# Patient Record
Sex: FEMALE | HISPANIC OR LATINO | Employment: FULL TIME | URBAN - NONMETROPOLITAN AREA
[De-identification: names, ages, dates, MRNs, and addresses within clinical notes are randomized per-mention and may not be internally consistent; named-entity substitution may affect disease eponyms.]

---

## 2024-01-08 ENCOUNTER — HOSPITAL ENCOUNTER (EMERGENCY)
Facility: HOSPITAL | Age: 29
Discharge: HOME | End: 2024-01-08
Attending: EMERGENCY MEDICINE
Payer: MEDICAID

## 2024-01-08 VITALS
OXYGEN SATURATION: 95 % | SYSTOLIC BLOOD PRESSURE: 141 MMHG | WEIGHT: 265 LBS | HEIGHT: 67 IN | TEMPERATURE: 99 F | BODY MASS INDEX: 41.59 KG/M2 | HEART RATE: 75 BPM | DIASTOLIC BLOOD PRESSURE: 90 MMHG | RESPIRATION RATE: 16 BRPM

## 2024-01-08 DIAGNOSIS — J02.0 ACUTE STREPTOCOCCAL PHARYNGITIS: Primary | ICD-10-CM

## 2024-01-08 DIAGNOSIS — R52 PAIN: ICD-10-CM

## 2024-01-08 LAB
FLUAV RNA RESP QL NAA+PROBE: NOT DETECTED
FLUBV RNA RESP QL NAA+PROBE: NOT DETECTED
HETEROPH AB SERPLBLD QL IA.RAPID: NEGATIVE
S PYO DNA THROAT QL NAA+PROBE: NOT DETECTED
SARS-COV-2 RNA RESP QL NAA+PROBE: NOT DETECTED

## 2024-01-08 PROCEDURE — 99283 EMERGENCY DEPT VISIT LOW MDM: CPT | Performed by: EMERGENCY MEDICINE

## 2024-01-08 PROCEDURE — 87651 STREP A DNA AMP PROBE: CPT | Performed by: EMERGENCY MEDICINE

## 2024-01-08 PROCEDURE — 86308 HETEROPHILE ANTIBODY SCREEN: CPT | Performed by: EMERGENCY MEDICINE

## 2024-01-08 PROCEDURE — 87636 SARSCOV2 & INF A&B AMP PRB: CPT | Performed by: EMERGENCY MEDICINE

## 2024-01-08 PROCEDURE — 36415 COLL VENOUS BLD VENIPUNCTURE: CPT | Performed by: EMERGENCY MEDICINE

## 2024-01-08 RX ORDER — CEPHALEXIN 500 MG/1
500 CAPSULE ORAL 4 TIMES DAILY
Qty: 40 CAPSULE | Refills: 0 | Status: SHIPPED | OUTPATIENT
Start: 2024-01-08 | End: 2024-01-18

## 2024-01-08 RX ORDER — DEXAMETHASONE 6 MG/1
12 TABLET ORAL DAILY
Qty: 2 TABLET | Refills: 0 | Status: SHIPPED | OUTPATIENT
Start: 2024-01-08 | End: 2024-01-09

## 2024-01-08 RX ORDER — IBUPROFEN 600 MG/1
600 TABLET ORAL EVERY 8 HOURS PRN
Qty: 30 TABLET | Refills: 0 | Status: SHIPPED | OUTPATIENT
Start: 2024-01-08

## 2024-01-08 ASSESSMENT — COLUMBIA-SUICIDE SEVERITY RATING SCALE - C-SSRS
2. HAVE YOU ACTUALLY HAD ANY THOUGHTS OF KILLING YOURSELF?: NO
6. HAVE YOU EVER DONE ANYTHING, STARTED TO DO ANYTHING, OR PREPARED TO DO ANYTHING TO END YOUR LIFE?: NO
1. IN THE PAST MONTH, HAVE YOU WISHED YOU WERE DEAD OR WISHED YOU COULD GO TO SLEEP AND NOT WAKE UP?: NO

## 2024-01-08 ASSESSMENT — PAIN SCALES - GENERAL: PAINLEVEL_OUTOF10: 6

## 2024-01-08 ASSESSMENT — PAIN - FUNCTIONAL ASSESSMENT: PAIN_FUNCTIONAL_ASSESSMENT: 0-10

## 2024-01-08 ASSESSMENT — PAIN DESCRIPTION - LOCATION: LOCATION: THROAT

## 2024-01-08 NOTE — ED PROVIDER NOTES
HPI   Chief Complaint   Patient presents with    Sore Throat     Pt to ED with sore throat and swollen glands, Pt verbalizes pain 6/10 with use of ibuprofen.  Saw her PCP 1 week prior with negative strep and Covid, no rx given at that time.  Pt has been treating symptoms but they've gotten worse, including low level fever       HPI                    No data recorded                Patient History   No past medical history on file.  No past surgical history on file.  No family history on file.  Social History     Tobacco Use    Smoking status: Not on file    Smokeless tobacco: Not on file   Substance Use Topics    Alcohol use: Not on file    Drug use: Not on file       Physical Exam   ED Triage Vitals [01/08/24 0903]   Temp Pulse Resp BP   37.2 °C (99 °F) -- 17 143/86      SpO2 Temp Source Heart Rate Source Patient Position   98 % Temporal Monitor Sitting      BP Location FiO2 (%)     Left arm --       Physical Exam  Constitutional:       General: She is not in acute distress.     Appearance: Normal appearance. She is not toxic-appearing.   HENT:      Head: Normocephalic and atraumatic.      Right Ear: Tympanic membrane normal.      Left Ear: Tympanic membrane normal.      Mouth/Throat:      Mouth: Mucous membranes are moist.      Tonsils: Tonsillar exudate present. No tonsillar abscesses. 1+ on the right. 1+ on the left.   Eyes:      Conjunctiva/sclera: Conjunctivae normal.      Pupils: Pupils are equal, round, and reactive to light.   Cardiovascular:      Rate and Rhythm: Normal rate and regular rhythm.      Pulses: Normal pulses.      Heart sounds: Normal heart sounds.   Pulmonary:      Effort: Pulmonary effort is normal. No respiratory distress.      Breath sounds: Normal breath sounds. No wheezing.   Abdominal:      General: Bowel sounds are normal.      Palpations: Abdomen is soft.      Tenderness: There is no abdominal tenderness. There is no guarding or rebound.   Musculoskeletal:         General: Normal  range of motion.      Cervical back: Normal range of motion.   Skin:     General: Skin is warm and dry.   Neurological:      General: No focal deficit present.      Mental Status: She is alert and oriented to person, place, and time.         ED Course & MDM   ED Course as of 01/08/24 1018   Mon Jan 08, 2024   0907 Seen and examined the pt, slightly red tonsils [KA]      ED Course User Index  [KA] Bernabe Gramajo DO         Diagnoses as of 01/08/24 1018   Acute streptococcal pharyngitis       Medical Decision Making  28-year-old female presents to the ER with chief complaint of sore throat patient reports that she takes strep and she thinks it strep even though she had a negative test today reretested her today all test came back negative again.  Patient probably has a viral pharyngitis.  But patient thinks that for sure she needs antibiotics.  Will give her dose of steroids anti-inflammatories and antibiotics.  Will give her referral to specialist.  Rest her exam is normal show some inflamed lymph nodes on exam.  The tonsillar pillars are slightly erythematous with exudate.  Monospot is negative patient discharged home to follow-up with specialist.        Procedure  Procedures     Bernabe Gramajo DO  01/08/24 1018

## 2024-02-27 ENCOUNTER — HOSPITAL ENCOUNTER (EMERGENCY)
Facility: HOSPITAL | Age: 29
Discharge: LEFT BEFORE DISCHARGE | End: 2024-02-27
Payer: MEDICAID

## 2024-02-27 PROCEDURE — 99281 EMR DPT VST MAYX REQ PHY/QHP: CPT

## 2024-02-27 PROCEDURE — 99283 EMERGENCY DEPT VISIT LOW MDM: CPT

## 2024-02-28 ENCOUNTER — HOSPITAL ENCOUNTER (EMERGENCY)
Facility: HOSPITAL | Age: 29
Discharge: HOME | End: 2024-02-28
Attending: EMERGENCY MEDICINE
Payer: MEDICAID

## 2024-02-28 VITALS
OXYGEN SATURATION: 96 % | HEART RATE: 92 BPM | HEIGHT: 67 IN | TEMPERATURE: 98.1 F | SYSTOLIC BLOOD PRESSURE: 117 MMHG | DIASTOLIC BLOOD PRESSURE: 83 MMHG | WEIGHT: 260 LBS | RESPIRATION RATE: 17 BRPM | BODY MASS INDEX: 40.81 KG/M2

## 2024-02-28 DIAGNOSIS — B34.9 VIRAL ILLNESS: Primary | ICD-10-CM

## 2024-02-28 LAB
FLUAV RNA RESP QL NAA+PROBE: NOT DETECTED
FLUBV RNA RESP QL NAA+PROBE: NOT DETECTED
S PYO DNA THROAT QL NAA+PROBE: NOT DETECTED
SARS-COV-2 RNA RESP QL NAA+PROBE: NOT DETECTED

## 2024-02-28 PROCEDURE — 87651 STREP A DNA AMP PROBE: CPT | Performed by: EMERGENCY MEDICINE

## 2024-02-28 PROCEDURE — 87636 SARSCOV2 & INF A&B AMP PRB: CPT | Performed by: EMERGENCY MEDICINE

## 2024-02-28 PROCEDURE — 2500000001 HC RX 250 WO HCPCS SELF ADMINISTERED DRUGS (ALT 637 FOR MEDICARE OP): Mod: SE | Performed by: EMERGENCY MEDICINE

## 2024-02-28 PROCEDURE — 99283 EMERGENCY DEPT VISIT LOW MDM: CPT

## 2024-02-28 RX ORDER — IBUPROFEN 600 MG/1
TABLET ORAL
Status: COMPLETED
Start: 2024-02-28 | End: 2024-02-28

## 2024-02-28 RX ORDER — IBUPROFEN 600 MG/1
600 TABLET ORAL ONCE
Status: COMPLETED | OUTPATIENT
Start: 2024-02-28 | End: 2024-02-28

## 2024-02-28 RX ADMIN — IBUPROFEN 600 MG: 600 TABLET ORAL at 09:37

## 2024-02-28 ASSESSMENT — COLUMBIA-SUICIDE SEVERITY RATING SCALE - C-SSRS
1. IN THE PAST MONTH, HAVE YOU WISHED YOU WERE DEAD OR WISHED YOU COULD GO TO SLEEP AND NOT WAKE UP?: NO
2. HAVE YOU ACTUALLY HAD ANY THOUGHTS OF KILLING YOURSELF?: NO
6. HAVE YOU EVER DONE ANYTHING, STARTED TO DO ANYTHING, OR PREPARED TO DO ANYTHING TO END YOUR LIFE?: NO

## 2024-02-28 ASSESSMENT — PAIN DESCRIPTION - LOCATION: LOCATION: THROAT

## 2024-02-28 ASSESSMENT — PAIN DESCRIPTION - PAIN TYPE: TYPE: ACUTE PAIN

## 2024-02-28 ASSESSMENT — PAIN SCALES - GENERAL: PAINLEVEL_OUTOF10: 9

## 2024-02-28 ASSESSMENT — PAIN - FUNCTIONAL ASSESSMENT: PAIN_FUNCTIONAL_ASSESSMENT: 0-10

## 2024-02-28 NOTE — ED PROVIDER NOTES
HPI   Chief Complaint   Patient presents with    Flu Symptoms     Pt to ED with throat pain and general body aches for 1 day.  Reports fever at home the previous day.       This 28-year-old woman presents with complaint of sore throat swelling in her neck plans chills and fever all night.  She had sweating as well.  She left work early because she did not feel well her boss also left work early because he did not feel well.  She has body aches and nausea but no vomiting.  Throughout the night it seemed to get worse.  She does not smoke.  She denies any abdominal pain.  No pain with urination.  Most of discomfort is in her neck and the body aches.  She has not taken any pain medicine.                           No data recorded                   Patient History   No past medical history on file.  No past surgical history on file.  No family history on file.  Social History     Tobacco Use    Smoking status: Not on file    Smokeless tobacco: Not on file   Substance Use Topics    Alcohol use: Not on file    Drug use: Not on file       Physical Exam   ED Triage Vitals [02/28/24 0902]   Temperature Heart Rate Respirations BP   36.7 °C (98.1 °F) 92 17 117/83      Pulse Ox Temp Source Heart Rate Source Patient Position   96 % Oral Monitor Sitting      BP Location FiO2 (%)     Left arm --       Physical Exam  Vitals reviewed.   HENT:      Right Ear: External ear normal.      Left Ear: External ear normal.      Nose: Nose normal.      Mouth/Throat:      Comments: Oropharynx is midline uvula with erythematous bilateral tonsils no exudate noted they are slightly edematous as well.  Moist mucous membranes  Eyes:      Conjunctiva/sclera: Conjunctivae normal.   Neck:      Comments: Clear with minimally palpable lymph nodes bilaterally anterior cervical chain  Cardiovascular:      Rate and Rhythm: Normal rate and regular rhythm.   Pulmonary:      Effort: Pulmonary effort is normal.      Breath sounds: Normal breath sounds.    Neurological:      Mental Status: She is alert.         ED Course & MDM   ED Course as of 02/28/24 1050   Wed Feb 28, 2024   0928 Presents with achy all over and fever since last night.  Her boss also has similar symptoms.  Worked up in ED with throat swab and nasal swab.  Patient was given some Motrin for her symptoms.  Airways intact.  Differential includes viral versus bacterial etiology of her pharyngitis and symptoms. [RZ]   0929 Midline uvula normal swallow and phonation no sign of peritonsillar abscess. [RZ]   1050 This came back negative.  I talked to the patient about the differential.  Sometimes the swabs to be negative and they can still have an infection.  Patient will be discharged home given a work note and encouraged to follow-up as an outpatient.  Antibiotics not clinically indicated.  Talk to patient about avoiding other people. [RZ]      ED Course User Index  [RZ] Miguel Ace MD         Diagnoses as of 02/28/24 1050   Viral illness       Medical Decision Making      Procedure  Procedures     Miguel Ace MD  02/28/24 1050

## 2024-02-28 NOTE — Clinical Note
Facundo Mar was seen and treated in our emergency department on 2/28/2024.  She may return to work on 03/04/2024.  IF BETTER     If you have any questions or concerns, please don't hesitate to call.      Miguel Ace MD

## 2024-07-08 ENCOUNTER — HOSPITAL ENCOUNTER (EMERGENCY)
Facility: HOSPITAL | Age: 29
Discharge: HOME | End: 2024-07-08
Payer: MEDICAID

## 2024-07-08 VITALS
WEIGHT: 270 LBS | TEMPERATURE: 98.2 F | SYSTOLIC BLOOD PRESSURE: 145 MMHG | OXYGEN SATURATION: 100 % | HEART RATE: 66 BPM | BODY MASS INDEX: 42.38 KG/M2 | RESPIRATION RATE: 18 BRPM | HEIGHT: 67 IN | DIASTOLIC BLOOD PRESSURE: 87 MMHG

## 2024-07-08 DIAGNOSIS — B37.0 ORAL CANDIDIASIS: ICD-10-CM

## 2024-07-08 DIAGNOSIS — J02.0 STREP PHARYNGITIS: Primary | ICD-10-CM

## 2024-07-08 LAB — S PYO DNA THROAT QL NAA+PROBE: NOT DETECTED

## 2024-07-08 PROCEDURE — 99283 EMERGENCY DEPT VISIT LOW MDM: CPT

## 2024-07-08 PROCEDURE — 87651 STREP A DNA AMP PROBE: CPT

## 2024-07-08 PROCEDURE — 2500000001 HC RX 250 WO HCPCS SELF ADMINISTERED DRUGS (ALT 637 FOR MEDICARE OP): Mod: SE

## 2024-07-08 RX ORDER — NYSTATIN 100000 [USP'U]/ML
5 SUSPENSION ORAL 4 TIMES DAILY
Qty: 100 ML | Refills: 0 | Status: SHIPPED | OUTPATIENT
Start: 2024-07-08 | End: 2024-07-13

## 2024-07-08 RX ORDER — CEPHALEXIN 500 MG/1
500 CAPSULE ORAL 2 TIMES DAILY
Qty: 20 CAPSULE | Refills: 0 | Status: SHIPPED | OUTPATIENT
Start: 2024-07-08 | End: 2024-07-18

## 2024-07-08 RX ORDER — CEPHALEXIN 250 MG/1
500 CAPSULE ORAL ONCE
Status: COMPLETED | OUTPATIENT
Start: 2024-07-08 | End: 2024-07-08

## 2024-07-08 RX ORDER — CEPHALEXIN 250 MG/1
CAPSULE ORAL
Status: DISCONTINUED
Start: 2024-07-08 | End: 2024-07-08 | Stop reason: HOSPADM

## 2024-07-08 ASSESSMENT — COLUMBIA-SUICIDE SEVERITY RATING SCALE - C-SSRS
6. HAVE YOU EVER DONE ANYTHING, STARTED TO DO ANYTHING, OR PREPARED TO DO ANYTHING TO END YOUR LIFE?: NO
1. IN THE PAST MONTH, HAVE YOU WISHED YOU WERE DEAD OR WISHED YOU COULD GO TO SLEEP AND NOT WAKE UP?: NO
2. HAVE YOU ACTUALLY HAD ANY THOUGHTS OF KILLING YOURSELF?: NO

## 2024-07-08 ASSESSMENT — PAIN SCALES - GENERAL: PAINLEVEL_OUTOF10: 9

## 2024-07-08 ASSESSMENT — PAIN - FUNCTIONAL ASSESSMENT: PAIN_FUNCTIONAL_ASSESSMENT: 0-10

## 2024-07-08 NOTE — ED PROVIDER NOTES
Limitations to history: None  Independent Historians: Family  External Records Reviewed: HIE, OARRS, outpatient notes, inpatient notes, paper charts if needed    History of Present Illness:  Patient is a 29-year-old female presents to ED with chief complaint of a sore throat, possible oral thrush.  Patient reports that she has been continuously treated for strep throat over the past 4 months, reports that recently she was also treated for BV by her OB/GYN and was placed on antibiotics.  Reports that as soon as her antibiotics finished she developed white patches on her lip and on her throat.  Patient reports that she believes she may have oral thrush.  Reports a continuous sore throat.  Denies any known fevers and/or chills.  Denies any nausea, vomiting, diarrhea.  Patient reports a past medical history of BV, asthma, strep infections.  Reports current symptoms for over 1 month.  Patient is alert and orient x 3 upon examination, in otherwise no apparent distress.    Denies HA, C/P, SOB, ABD pain, Nausea, Vomiting, Diarrhea, Weakness, Dizziness, Fever, Chills.    PMFSH:   As per HPI, otherwise nurses notes reviewed in EMR    Physical Exam:  Appearance: Alert, oriented x3, supine on exam table with head elevated, cooperative, in no acute distress. Well nourished & well hydrated.      Skin: Intact, dry skin, no lesions, rash, petechiae or purpura.     Eyes: PERRLA, EOMs intact, Conjunctiva pink with no redness or exudates. No scleral icterus.     Ears: Hearing grossly intact.      Nose: Nares patent, no epistaxis.     Mouth: Tonsillar hypertrophy noted bilaterally with exudate present.  Uvula is midline, there is no evidence of peritonsillar abscess formation.  Dentition without concerning abnormalities. no obstruction of posterior pharynx.     Neck: Supple, without meningismus. Trachea at midline.     Pulmonary: Clear bilaterally with good chest wall excursion. No rales, rhonchi or wheezing. No accessory muscle use or  stridor. Talking in full sentences.     Cardiac: Normal S1, S2 without murmur, rub, gallop or extrasystole.     Abdomen: Soft, nontender to light and deep palpation to all quadrants, normoactive bowel sounds.  No palpable organomegaly.  No rebound or guarding.     Genitourinary: Physical exam deferred.     Musculoskeletal: Normal gait. Full range of motion to all extremities. Rest of the exam reveals no pain on palpation, instability, or deformity. Pulses full and equal. No cyanosis or clubbing. capillary refill <2 seconds to all examined digits.     Neurological:  Cranial nerves II through XII are grossly intact, normal sensation, no weakness, no focal findings identified.      Psychiatric: Appropriate mood and affect.    Labs Reviewed   GROUP A STREPTOCOCCUS, PCR - Normal       Result Value    Group A Strep PCR Not Detected     GROUP A STREPTOCOCCUS, CULTURE      No orders to display                Repeat Evaluation below    Summary:  Medical Decision Making:   Patient presented as described in HPI. Patient case including ROS, PE, and treatment and plan discussed with ED attending if attached as cosigner. Due to patients presentation orders completed include as documented.  Patient evaluated for complaints of a sore throat, possible thrush, possible strep throat.  Strep testing negative.  Due to patient's presentation and current signs and symptoms patient will be placed on Keflex, for strep pharyngitis.  Patient will also be placed on nystatin oral suspension for the treatment of oral thrush.  Patient will be referred to infectious disease at this time due to frequent complaints of sore throat, oral thrush, frequent strep infections.  Patient aware to follow-up with Dr. Al Solis.  Patient aware that if she develops worsening signs and or symptoms, fevers, chills, nausea, vomiting or diarrhea to return to ED for further evaluation treatment, otherwise follow-up with ID.  Patient was advised to follow up with  PCP or recommended provider in 2-3 days for another evaluation and exam. I advised patient/guardian to return or go to closest emergency room immediately if symptoms change, get worse, new symptoms develop prior to follow up. If there is no improvement in symptoms in the next 24 hours they are advised to return for further evaluation and exam. I also explained the plan and treatment course. Patient/guardian is in agreement with plan, treatment course, and follow up and states verbally that they will comply.    Tests/Medications/Escalations of Care considered but not given:    Patient care discussed with: N/A  Social Determinants affecting care: N/A    Final diagnosis and disposition as documented in impression    Homegoing. I discussed the differential; results and discharge plan with the patient and/or family/friend/caregiver if present.  I emphasized the importance of follow-up with the physician I referred them to in the timeframe recommended.  I explained reasons for the patient to return to the Emergency Department. They agreed that if they feel their condition is worsening or if they have any other concern they should call 911 immediately for further assistance. I gave the patient an opportunity to ask all questions they had and answered all of them accordingly. They understand return precautions and discharge instructions. The patient and/or family/friend/caregiver expressed understanding verbally and that they would comply.       Disposition:  Discharge         This note has been transcribed using voice recognition and may contain grammatical errors, misplaced words, incorrect words, incorrect phrases or other errors.     Tamica Cortes, ROSEMARY-DANIEL  07/08/24 7798

## 2024-07-08 NOTE — ED TRIAGE NOTES
Patient c/o swelling, pain and white patches to tonsils. Patient states this was a reaction to an antibiotic she was prescribed for bacterial vaginosis. Patient states the oral pain and swelling is worsening and now she is having difficulty eating secondary to swelling and oral pain

## 2024-08-05 ENCOUNTER — APPOINTMENT (OUTPATIENT)
Dept: INFECTIOUS DISEASES | Facility: CLINIC | Age: 29
End: 2024-08-05
Payer: MEDICAID

## 2024-08-26 NOTE — PROGRESS NOTES
[facility] Infectious Diseases Consultation Note    Date of Consultation/Follow-up: 8/26/2024  Primary MD: No Assigned PCP Generic Provider, MD    Reason For Consult: Frequent pharyngitis    History Of Present Illness  Facundo Mar is a 29 y.o. female with allergic rhinitis, asthma (cough-variant), and recent issues with recurrent pharyngitis presenting for further evaluation.    She had visits for sore throat documented around 1/2/24 with pharyngitis; GAS PCR negative, as was covid/flu/RSV PCRs. She visited the ED again at [facility] on 1/8/24 with ongoing symptoms; she was given steroids and antibiotics, plus scheduled for a follow-up with ENT. She had repeat ED visits in 2/2024 and again 7/2024 for similar symptoms. She reported as soon as she completes antibiotic therapy she developes white patches on lip and throat. She was provided with 10 days of Keflex and 5 days swish and spit for thrush.    Today she reports that her prior throat pain/irritation/swelling and drainage has improved since her last round of antibiotics and antifungals, but she has residual ongoing burning with certain foods (spices, salt) and feeling of intermittent dysphagia that has overall improved. She notes that she has significant heartburn and acid reflux that seems to have worsened around 12-1/2024. She denies fevers, chills, sweats, lymphadenopathy, recent travel, sick contacts prior to her symptom onset in 1/2024. She had mono in 2009 to her recollection, unsure if diagnosed by blood test however. She has chronic bilateral abdominal pain that is unchanged and has not had a reason for presentation otherwise. She did have a rash at one point during her course that she attributes to allergic type reaction to one of her lotions.    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 Parakeets. Moved in with grandmother  in 12/2023, who has the [employer]; she . Healthy.  -Outdoor activities: hiking often, gym. [place] at [facility]  -Livestock: no cattle, cows  -Tobacco: Allergic to nicotine. Grandparents smoke heavily however  -ETOH: occasionally, not regularly.  -IVDU: Smokes marijuana occasionally, no IV/intranasal drug use   -Sexual history: Recently broke up with long-term boyfriend of 5 years. Not been checked for STIs since.    Past Medical History  She has no past medical history on file.    Surgical History  She has no past surgical history on file.     Social History     Occupational History    Not on file   Tobacco Use    Smoking status: Never    Smokeless tobacco: Never   Substance and Sexual Activity    Alcohol use: Never    Drug use: Yes     Types: Marijuana    Sexual activity: Not on file     Travel History   Travel since 07/26/24    No documented travel since 07/26/24            Family History  No family history on file.  Allergies  Contact metal agent, Estradiol, and Penicillins   -Penicillin: yeast infections otherwise. No rash, SOB, etc.      There is no immunization history on file for this patient.  Medications  Current medications:  Scheduled medications    Continuous medications    PRN medications      Review of Systems     Objective  Range of Vitals (last 24 hours)  Vitals:    08/27/24 0818   BP: 124/79   Pulse: 83   Temp: 37.1 °C (98.7 °F)       Daily Weight  07/08/24 : 122 kg (270 lb)    There is no height or weight on file to calculate BMI.     Physical Exam  GENERAL APPEARANCE: Awake and alert and not in any distress  HEENT: Atraumatic and normocephalic. EOMI, anicteric sclera, no conjunctival injection.  THROAT: Bilateral tonsillar enlargement, crowded OP without active ulceration or drainage appreciated. No ulcers or thrush visible today. Scattered dental fillings. No LAD noted.  NECK: Supple  CARDIAC: Regular, no murmurs  LUNGS: Clear bilateral  ABDOMEN: Soft. Mild TTP in RUQ and LUQ with mild palpable  "liver/spleen edges. No rebound, guarding.  MUSCULOSKELETAL: No swelling of major joints  EXTREMITIES: No edema  NEUROLOGICAL: Well oriented and answers appropriately  SKIN: No rash or ulcers      Relevant Results    Labs              CrCl cannot be calculated (Patient's most recent lab result is older than the maximum 3 days allowed.).  No results found for: \"CRP\", \"SEDRATE\"    Microbiology  -01/02/24 COVID, Flu, RSV PCR: negative  -01/08/24 GAS PCR: negative  -01/08/24 Mono screen: negative  -01/08/24 COVID and Flu PCR: negative  -02/28/24 GAS PCR swab: negative  -02/28/24 Flu and Covid PCR: negative  -07/08/24 GAS PCR: negative    Assessment/Plan  Facundo Mar is a 29 y.o. female with allergic rhinitis, asthma (cough-variant), and recent issues with recurrent pharyngitis since 1/2024 presenting for further evaluation.    Recurrent pharyngitis episodes (variably responsive to antibiotics/antifungals) with bilateral tonsillar enlargement on exam. Will obtain throat culture and test for STIs as well as mononucleosis syndromes. Will refer to ENT colleagues for assessment for LAR, etc given tonsillar enlargement and reports of reflux. Her ongoing burning/irritation of the throat rather than tonsils could be related to candidiasis that requires slightly longer therapy.    Plan:  -CBC with differential, CMP  -Mono work-up: EBV, CMV serologies  -STI work-up: HIV Ab, syphilis Ab, GC/CT urine and swab of throat; hep C Ab, hep B Sag as no prior documented testing  -GAS throat culture  -Chlamydia antibodies (including for psittacosis) given recently moved into house with 4 parakeets just prior to onset of symptoms; symptom course unusual for psittacosis but possible  -Fluconazole 200mg daily for potential esophageal candidiasis x14 days  -ENT referral  -Follow-up TBD pending lab work-up    I spent 60 minutes in the care of this patient, including chart review, patient interview/exam, and documentation.    Ganesh RAMSEY" MD [place]

## 2024-08-27 ENCOUNTER — APPOINTMENT (OUTPATIENT)
Dept: INFECTIOUS DISEASES | Facility: CLINIC | Age: 29
End: 2024-08-27
Payer: MEDICAID

## 2024-08-27 VITALS
HEART RATE: 83 BPM | BODY MASS INDEX: 45.99 KG/M2 | DIASTOLIC BLOOD PRESSURE: 79 MMHG | HEIGHT: 67 IN | SYSTOLIC BLOOD PRESSURE: 124 MMHG | TEMPERATURE: 98.7 F | WEIGHT: 293 LBS

## 2024-08-27 DIAGNOSIS — J02.0 STREP PHARYNGITIS: ICD-10-CM

## 2024-08-27 DIAGNOSIS — Z11.3 SCREEN FOR STD (SEXUALLY TRANSMITTED DISEASE): Primary | ICD-10-CM

## 2024-08-27 DIAGNOSIS — B37.0 ORAL CANDIDIASIS: ICD-10-CM

## 2024-08-27 DIAGNOSIS — J02.9 PHARYNGITIS, UNSPECIFIED ETIOLOGY: ICD-10-CM

## 2024-08-27 PROCEDURE — 99205 OFFICE O/P NEW HI 60 MIN: CPT | Performed by: STUDENT IN AN ORGANIZED HEALTH CARE EDUCATION/TRAINING PROGRAM

## 2024-08-27 PROCEDURE — 87081 CULTURE SCREEN ONLY: CPT

## 2024-08-27 PROCEDURE — 3008F BODY MASS INDEX DOCD: CPT | Performed by: STUDENT IN AN ORGANIZED HEALTH CARE EDUCATION/TRAINING PROGRAM

## 2024-08-27 RX ORDER — ALBUTEROL SULFATE 90 UG/1
2 INHALANT RESPIRATORY (INHALATION) EVERY 4 HOURS PRN
COMMUNITY
Start: 2023-08-13

## 2024-08-27 RX ORDER — FLUCONAZOLE 200 MG/1
200 TABLET ORAL DAILY
Qty: 14 TABLET | Refills: 0 | Status: SHIPPED | OUTPATIENT
Start: 2024-08-27 | End: 2024-09-10

## 2024-08-27 RX ORDER — ALBUTEROL SULFATE 0.63 MG/3ML
1 SOLUTION RESPIRATORY (INHALATION) EVERY 6 HOURS PRN
COMMUNITY

## 2024-08-27 ASSESSMENT — PAIN SCALES - GENERAL: PAINLEVEL: 0-NO PAIN

## 2024-08-27 NOTE — LETTER
08/27/24    ROSEMARY Tello-CNP  [address] [facility] [address]      Dear ROSEMARY Velarde-CNP,    Thank you for referring your patient, Facundo Mar, to receive care in my office. I have enclosed a summary of the care provided to Facundo on 08/27/24.    Please contact me with any questions you may have regarding the visit.    Sincerely,         Ganesh Barreto MD  [address] [facility] [facility] [address]  [facility] [address]    CC: No Recipients

## 2024-08-27 NOTE — PATIENT INSTRUCTIONS
Today we met regarding your recurrent sore throat. We will test the blood as well as some throat samples for different causes of this. We will trial a different antifungal medicine in the even the thrush is driving things in your throat given your ongoing burning/discomfort with swallowing. I do think that it will be worth you seeing our ENT doctors given your tonsils did seem enlarged today, which can occur for reasons independent of infection as well (for example, reflux) but might predispose to infection.    Office phone: [phone]

## 2024-08-29 LAB — S PYO THROAT QL CULT: NORMAL

## 2024-09-10 ENCOUNTER — LAB (OUTPATIENT)
Dept: LAB | Facility: LAB | Age: 29
End: 2024-09-10
Payer: MEDICAID

## 2024-09-10 DIAGNOSIS — Z11.3 SCREEN FOR STD (SEXUALLY TRANSMITTED DISEASE): ICD-10-CM

## 2024-09-10 DIAGNOSIS — J02.9 PHARYNGITIS, UNSPECIFIED ETIOLOGY: ICD-10-CM

## 2024-09-10 LAB
ALBUMIN SERPL BCP-MCNC: 3.9 G/DL (ref 3.4–5)
ALP SERPL-CCNC: 68 U/L (ref 33–110)
ALT SERPL W P-5'-P-CCNC: 14 U/L (ref 7–45)
ANION GAP SERPL CALC-SCNC: 11 MMOL/L (ref 10–20)
AST SERPL W P-5'-P-CCNC: 18 U/L (ref 9–39)
BASOPHILS # BLD AUTO: 0.03 X10*3/UL (ref 0–0.1)
BASOPHILS NFR BLD AUTO: 0.4 %
BILIRUB SERPL-MCNC: 0.5 MG/DL (ref 0–1.2)
BUN SERPL-MCNC: 7 MG/DL (ref 6–23)
CALCIUM SERPL-MCNC: 9.4 MG/DL (ref 8.6–10.3)
CHLORIDE SERPL-SCNC: 105 MMOL/L (ref 98–107)
CMV IGG AVIDITY SERPL IA-RTO: REACTIVE %
CO2 SERPL-SCNC: 26 MMOL/L (ref 21–32)
CREAT SERPL-MCNC: 0.53 MG/DL (ref 0.5–1.05)
EBV EA IGG SER QL: NEGATIVE
EBV NA AB SER QL: POSITIVE
EBV VCA IGG SER IA-ACNC: POSITIVE
EBV VCA IGM SER IA-ACNC: NEGATIVE
EGFRCR SERPLBLD CKD-EPI 2021: >90 ML/MIN/1.73M*2
EOSINOPHIL # BLD AUTO: 0.17 X10*3/UL (ref 0–0.7)
EOSINOPHIL NFR BLD AUTO: 2.2 %
ERYTHROCYTE [DISTWIDTH] IN BLOOD BY AUTOMATED COUNT: 11.9 % (ref 11.5–14.5)
GLUCOSE SERPL-MCNC: 88 MG/DL (ref 74–99)
HBV SURFACE AG SERPL QL IA: NONREACTIVE
HCT VFR BLD AUTO: 42.1 % (ref 36–46)
HCV AB SER QL: NONREACTIVE
HGB BLD-MCNC: 13.8 G/DL (ref 12–16)
HIV 1+2 AB+HIV1 P24 AG SERPL QL IA: NONREACTIVE
IMM GRANULOCYTES # BLD AUTO: 0.02 X10*3/UL (ref 0–0.7)
IMM GRANULOCYTES NFR BLD AUTO: 0.3 % (ref 0–0.9)
LYMPHOCYTES # BLD AUTO: 3.26 X10*3/UL (ref 1.2–4.8)
LYMPHOCYTES NFR BLD AUTO: 41.9 %
MCH RBC QN AUTO: 30.9 PG (ref 26–34)
MCHC RBC AUTO-ENTMCNC: 32.8 G/DL (ref 32–36)
MCV RBC AUTO: 94 FL (ref 80–100)
MONOCYTES # BLD AUTO: 0.49 X10*3/UL (ref 0.1–1)
MONOCYTES NFR BLD AUTO: 6.3 %
NEUTROPHILS # BLD AUTO: 3.81 X10*3/UL (ref 1.2–7.7)
NEUTROPHILS NFR BLD AUTO: 48.9 %
NRBC BLD-RTO: 0 /100 WBCS (ref 0–0)
PLATELET # BLD AUTO: 341 X10*3/UL (ref 150–450)
POTASSIUM SERPL-SCNC: 4.1 MMOL/L (ref 3.5–5.3)
PROT SERPL-MCNC: 7.7 G/DL (ref 6.4–8.2)
RBC # BLD AUTO: 4.46 X10*6/UL (ref 4–5.2)
SODIUM SERPL-SCNC: 138 MMOL/L (ref 136–145)
TREPONEMA PALLIDUM IGG+IGM AB [PRESENCE] IN SERUM OR PLASMA BY IMMUNOASSAY: REACTIVE
WBC # BLD AUTO: 7.8 X10*3/UL (ref 4.4–11.3)

## 2024-09-10 PROCEDURE — 86780 TREPONEMA PALLIDUM: CPT

## 2024-09-10 PROCEDURE — 87491 CHLMYD TRACH DNA AMP PROBE: CPT

## 2024-09-10 PROCEDURE — 36415 COLL VENOUS BLD VENIPUNCTURE: CPT

## 2024-09-10 PROCEDURE — 86664 EPSTEIN-BARR NUCLEAR ANTIGEN: CPT

## 2024-09-10 PROCEDURE — 87389 HIV-1 AG W/HIV-1&-2 AB AG IA: CPT

## 2024-09-10 PROCEDURE — 87340 HEPATITIS B SURFACE AG IA: CPT

## 2024-09-10 PROCEDURE — 87591 N.GONORRHOEAE DNA AMP PROB: CPT

## 2024-09-10 PROCEDURE — 86631 CHLAMYDIA ANTIBODY: CPT

## 2024-09-10 PROCEDURE — 86665 EPSTEIN-BARR CAPSID VCA: CPT

## 2024-09-10 PROCEDURE — 86645 CMV ANTIBODY IGM: CPT

## 2024-09-10 PROCEDURE — 86318 IA INFECTIOUS AGENT ANTIBODY: CPT

## 2024-09-10 PROCEDURE — 86663 EPSTEIN-BARR ANTIBODY: CPT

## 2024-09-10 PROCEDURE — 86632 CHLAMYDIA IGM ANTIBODY: CPT

## 2024-09-10 PROCEDURE — 86803 HEPATITIS C AB TEST: CPT

## 2024-09-10 PROCEDURE — 86644 CMV ANTIBODY: CPT

## 2024-09-11 ENCOUNTER — TELEPHONE (OUTPATIENT)
Dept: INFECTIOUS DISEASES | Facility: HOSPITAL | Age: 29
End: 2024-09-11
Payer: MEDICAID

## 2024-09-11 DIAGNOSIS — A74.9 CHLAMYDIA: ICD-10-CM

## 2024-09-11 DIAGNOSIS — A53.9 SYPHILIS: Primary | ICD-10-CM

## 2024-09-11 LAB
C TRACH RRNA SPEC QL NAA+PROBE: POSITIVE
N GONORRHOEA DNA SPEC QL PROBE+SIG AMP: NEGATIVE
RPR SER QL: REACTIVE
RPR SER-TITR: ABNORMAL {TITER}

## 2024-09-11 NOTE — TELEPHONE ENCOUNTER
Attempted to call Ms. Mar this AM with results, left VM with callback.    ADDENDUM:  Called Ms. Mar. She has a positive syphilis Ab with RPR 1:32. She does not recall syphilis diagnosis in the past and is unsure of timing of her diagnosis given she recently was in a monogamous relationship. She does not recall a rash (did think she had a rash attributable to a lotion a few months back, but maybe that reflected secondary syphilis). She believes she had a visit at a women's clinic around 4 months ago were she was reportedly diagnosed with 2 infections; given pills for one infection but no shots otherwise. No HA, vision changes, gait instability.    Given unclear duration of symptoms, will plan for weekly IM PCN x3 weeks in our clinic. She was willing to come to [facility] for these injections. Will order today and request follow-up in 6 months time for repeat RPR.     ADDENDUM:  Called Ms. Mar, as recent testing suggests chlamydia diagnosis as well. No prior STI history other than gonorrhea. Does have a current partner. Will provide doxycycline x7 days with script for EPT, though advised her partner is tested also for syphilis as well.

## 2024-09-12 LAB — CMV IGM SERPL-ACNC: 32.7 AU/ML

## 2024-09-12 RX ORDER — DOXYCYCLINE HYCLATE 100 MG
100 TABLET ORAL EVERY 12 HOURS
Qty: 14 TABLET | Refills: 0 | Status: SHIPPED | OUTPATIENT
Start: 2024-09-12 | End: 2024-09-19

## 2024-09-13 LAB — SCAN RESULT: NORMAL

## 2024-09-16 ENCOUNTER — APPOINTMENT (OUTPATIENT)
Dept: INFECTIOUS DISEASES | Facility: CLINIC | Age: 29
End: 2024-09-16
Payer: MEDICAID

## 2024-09-16 DIAGNOSIS — A53.9 SYPHILIS: ICD-10-CM

## 2024-09-16 PROCEDURE — 96372 THER/PROPH/DIAG INJ SC/IM: CPT | Performed by: STUDENT IN AN ORGANIZED HEALTH CARE EDUCATION/TRAINING PROGRAM

## 2024-09-16 NOTE — PROGRESS NOTES
Received 1.2 million units of Bicillin in [facility] and 1.2 million units in [facility].  Used the department of Medicine supply.

## 2024-09-23 ENCOUNTER — APPOINTMENT (OUTPATIENT)
Dept: INFECTIOUS DISEASES | Facility: CLINIC | Age: 29
End: 2024-09-23
Payer: MEDICAID

## 2024-09-23 DIAGNOSIS — A53.9 SYPHILIS: ICD-10-CM

## 2024-09-23 PROCEDURE — 96372 THER/PROPH/DIAG INJ SC/IM: CPT | Performed by: STUDENT IN AN ORGANIZED HEALTH CARE EDUCATION/TRAINING PROGRAM

## 2024-09-23 NOTE — PROGRESS NOTES
Received Bicillin 1.2 million units in [facility] and 1.2 million units in [facility] .  Used the department of Medicine supply.

## 2024-09-30 ENCOUNTER — APPOINTMENT (OUTPATIENT)
Dept: INFECTIOUS DISEASES | Facility: CLINIC | Age: 29
End: 2024-09-30
Payer: MEDICAID

## 2024-09-30 DIAGNOSIS — A53.9 SYPHILIS: ICD-10-CM

## 2024-09-30 PROCEDURE — 96372 THER/PROPH/DIAG INJ SC/IM: CPT | Performed by: STUDENT IN AN ORGANIZED HEALTH CARE EDUCATION/TRAINING PROGRAM

## 2024-09-30 NOTE — PROGRESS NOTES
Received Bicillin 1.2 million units in [facility] and 1.2 million units in [facility].  Used the department of Medicine supply.

## 2025-01-29 ENCOUNTER — HOSPITAL ENCOUNTER (OUTPATIENT)
Dept: RADIOLOGY | Facility: HOSPITAL | Age: 30
Discharge: HOME | End: 2025-01-29
Payer: MEDICAID

## 2025-01-29 ENCOUNTER — APPOINTMENT (OUTPATIENT)
Dept: PRIMARY CARE | Facility: CLINIC | Age: 30
End: 2025-01-29
Payer: MEDICAID

## 2025-01-29 VITALS
HEART RATE: 73 BPM | TEMPERATURE: 97.9 F | HEIGHT: 67 IN | WEIGHT: 293 LBS | OXYGEN SATURATION: 98 % | BODY MASS INDEX: 45.99 KG/M2

## 2025-01-29 DIAGNOSIS — E66.01 MORBID OBESITY (MULTI): ICD-10-CM

## 2025-01-29 DIAGNOSIS — Z80.3 FAMILY HISTORY OF BREAST CANCER IN FIRST DEGREE RELATIVE: ICD-10-CM

## 2025-01-29 DIAGNOSIS — J45.20 MILD INTERMITTENT ASTHMA WITHOUT COMPLICATION (HHS-HCC): ICD-10-CM

## 2025-01-29 DIAGNOSIS — J32.9 SINUSITIS, UNSPECIFIED CHRONICITY, UNSPECIFIED LOCATION: ICD-10-CM

## 2025-01-29 DIAGNOSIS — F12.90 MARIJUANA USE: ICD-10-CM

## 2025-01-29 DIAGNOSIS — Z86.19 HISTORY OF SYPHILIS: ICD-10-CM

## 2025-01-29 DIAGNOSIS — Z86.19 HISTORY OF GONORRHEA: ICD-10-CM

## 2025-01-29 DIAGNOSIS — F14.11 HISTORY OF COCAINE ABUSE (MULTI): ICD-10-CM

## 2025-01-29 DIAGNOSIS — Z00.00 ANNUAL PHYSICAL EXAM: Primary | ICD-10-CM

## 2025-01-29 DIAGNOSIS — F41.9 ANXIETY: ICD-10-CM

## 2025-01-29 PROBLEM — R11.2 NAUSEA AND VOMITING: Status: ACTIVE | Noted: 2025-01-29

## 2025-01-29 PROBLEM — R10.9 ABDOMINAL PAIN: Status: ACTIVE | Noted: 2025-01-29

## 2025-01-29 PROBLEM — R11.15 CYCLICAL VOMITING SYNDROME: Status: ACTIVE | Noted: 2025-01-29

## 2025-01-29 PROBLEM — R30.0 DYSURIA: Status: ACTIVE | Noted: 2025-01-29

## 2025-01-29 PROBLEM — N39.0 ACUTE URINARY TRACT INFECTION: Status: ACTIVE | Noted: 2025-01-29

## 2025-01-29 PROBLEM — S80.00XA CONTUSION OF KNEE: Status: ACTIVE | Noted: 2025-01-29

## 2025-01-29 PROBLEM — R21 RASH: Status: ACTIVE | Noted: 2025-01-29

## 2025-01-29 PROBLEM — R42 DIZZINESS: Status: ACTIVE | Noted: 2025-01-29

## 2025-01-29 PROCEDURE — 71046 X-RAY EXAM CHEST 2 VIEWS: CPT

## 2025-01-29 PROCEDURE — 3008F BODY MASS INDEX DOCD: CPT | Performed by: INTERNAL MEDICINE

## 2025-01-29 PROCEDURE — 1036F TOBACCO NON-USER: CPT | Performed by: INTERNAL MEDICINE

## 2025-01-29 PROCEDURE — 99204 OFFICE O/P NEW MOD 45 MIN: CPT | Performed by: INTERNAL MEDICINE

## 2025-01-29 PROCEDURE — 71046 X-RAY EXAM CHEST 2 VIEWS: CPT | Performed by: RADIOLOGY

## 2025-01-29 PROCEDURE — 99395 PREV VISIT EST AGE 18-39: CPT | Performed by: INTERNAL MEDICINE

## 2025-01-29 RX ORDER — AZITHROMYCIN 250 MG/1
TABLET, FILM COATED ORAL
Qty: 6 TABLET | Refills: 0 | Status: SHIPPED | OUTPATIENT
Start: 2025-01-29 | End: 2025-02-03

## 2025-01-29 RX ORDER — MOMETASONE FUROATE AND FORMOTEROL FUMARATE DIHYDRATE 100; 5 UG/1; UG/1
2 AEROSOL RESPIRATORY (INHALATION)
COMMUNITY
End: 2025-01-29 | Stop reason: SDUPTHER

## 2025-01-29 RX ORDER — MOMETASONE FUROATE AND FORMOTEROL FUMARATE DIHYDRATE 100; 5 UG/1; UG/1
2 AEROSOL RESPIRATORY (INHALATION)
Qty: 13 G | Refills: 2 | Status: SHIPPED | OUTPATIENT
Start: 2025-01-29

## 2025-01-29 ASSESSMENT — ENCOUNTER SYMPTOMS
SORE THROAT: 0
FATIGUE: 0
DIARRHEA: 0
PALPITATIONS: 0
LOSS OF SENSATION IN FEET: 0
SINUS PAIN: 0
HEADACHES: 0
ABDOMINAL PAIN: 0
DEPRESSION: 1
BLOOD IN STOOL: 0
DIZZINESS: 0
WHEEZING: 0
ARTHRALGIAS: 0
DIFFICULTY URINATING: 0
BRUISES/BLEEDS EASILY: 0
OCCASIONAL FEELINGS OF UNSTEADINESS: 1
COUGH: 0
UNEXPECTED WEIGHT CHANGE: 1
FEVER: 0

## 2025-01-29 ASSESSMENT — ANXIETY QUESTIONNAIRES
6. BECOMING EASILY ANNOYED OR IRRITABLE: SEVERAL DAYS
GAD7 TOTAL SCORE: 13
2. NOT BEING ABLE TO STOP OR CONTROL WORRYING: MORE THAN HALF THE DAYS
1. FEELING NERVOUS, ANXIOUS, OR ON EDGE: NEARLY EVERY DAY
IF YOU CHECKED OFF ANY PROBLEMS ON THIS QUESTIONNAIRE, HOW DIFFICULT HAVE THESE PROBLEMS MADE IT FOR YOU TO DO YOUR WORK, TAKE CARE OF THINGS AT HOME, OR GET ALONG WITH OTHER PEOPLE: NOT DIFFICULT AT ALL
5. BEING SO RESTLESS THAT IT IS HARD TO SIT STILL: MORE THAN HALF THE DAYS
7. FEELING AFRAID AS IF SOMETHING AWFUL MIGHT HAPPEN: SEVERAL DAYS
4. TROUBLE RELAXING: MORE THAN HALF THE DAYS
3. WORRYING TOO MUCH ABOUT DIFFERENT THINGS: MORE THAN HALF THE DAYS

## 2025-01-29 ASSESSMENT — PATIENT HEALTH QUESTIONNAIRE - PHQ9
10. IF YOU CHECKED OFF ANY PROBLEMS, HOW DIFFICULT HAVE THESE PROBLEMS MADE IT FOR YOU TO DO YOUR WORK, TAKE CARE OF THINGS AT HOME, OR GET ALONG WITH OTHER PEOPLE: NOT DIFFICULT AT ALL
7. TROUBLE CONCENTRATING ON THINGS, SUCH AS READING THE NEWSPAPER OR WATCHING TELEVISION: SEVERAL DAYS
SUM OF ALL RESPONSES TO PHQ QUESTIONS 1-9: 10
3. TROUBLE FALLING OR STAYING ASLEEP OR SLEEPING TOO MUCH: SEVERAL DAYS
2. FEELING DOWN, DEPRESSED OR HOPELESS: SEVERAL DAYS
5. POOR APPETITE OR OVEREATING: NOT AT ALL
SUM OF ALL RESPONSES TO PHQ9 QUESTIONS 1 AND 2: 3
8. MOVING OR SPEAKING SO SLOWLY THAT OTHER PEOPLE COULD HAVE NOTICED. OR THE OPPOSITE, BEING SO FIGETY OR RESTLESS THAT YOU HAVE BEEN MOVING AROUND A LOT MORE THAN USUAL: NEARLY EVERY DAY
4. FEELING TIRED OR HAVING LITTLE ENERGY: SEVERAL DAYS
9. THOUGHTS THAT YOU WOULD BE BETTER OFF DEAD, OR OF HURTING YOURSELF: SEVERAL DAYS
6. FEELING BAD ABOUT YOURSELF - OR THAT YOU ARE A FAILURE OR HAVE LET YOURSELF OR YOUR FAMILY DOWN: NOT AT ALL
1. LITTLE INTEREST OR PLEASURE IN DOING THINGS: MORE THAN HALF THE DAYS

## 2025-01-29 NOTE — PATIENT INSTRUCTIONS

## 2025-01-29 NOTE — PROGRESS NOTES
Subjective   Patient ID: Facundo Mar is a 29 y.o. female who presents for Nasal Congestion (X 2 days), New Patient Visit (Saw Dr. Joel Trevino a few years ago and then another lady but doesn't know the name), and Asthma (Worse with seasonal allergies and temperature changes).    New patient to my office  Old records reviewed    Past medical history, history of treatment of syphilis penicillin IM every week x 3, treated with doxycycline for gonorrhea awaiting follow-up infectious disease at  in 6 months  - History of drug abuse cocaine methadone now patient drug-free  Using marijuana for anxiety  - Strong family history of breast cancer at young age and her mother age 25-30 her and history of brain cancer in her aunt and ovarian cancer patient may have underlying BRCA mutation  Referred patient to high risk breast surgery for further eval recommendation    Annual preventive visit  - Vaccinations are reviewed and up-to-date  -Counseled about breast cancer risk due to strong family history referred patient high risk of breast surgery  -Morbid obesity counseled about weight loss low-carb diet  - Marijuana use counseled about abstinence  -Recent treatment for syphilis and gonorrhea comes about prevention and protection  - Proceed with complete blood work follow-up results closely    Follow-up  - Cough congestion for the last 2 days  Sinusitis start patient Z-Glenn  - Underlying history of asthma continue with Dulera inhaler obtain chest x-ray  Follow-up 4 weeks for further recommendation    Asthma  There is no cough or wheezing. Pertinent negatives include no chest pain, ear pain, fever, headaches or sore throat. Her past medical history is significant for asthma.          Review of Systems   Constitutional:  Positive for unexpected weight change. Negative for fatigue and fever.   HENT:  Positive for congestion. Negative for ear discharge, ear pain, mouth sores, sinus pain and sore throat.    Eyes:  Negative for  "visual disturbance.   Respiratory:  Negative for cough and wheezing.    Cardiovascular:  Negative for chest pain, palpitations and leg swelling.   Gastrointestinal:  Negative for abdominal pain, blood in stool and diarrhea.   Genitourinary:  Negative for difficulty urinating.   Musculoskeletal:  Negative for arthralgias.   Skin:  Negative for rash.   Neurological:  Negative for dizziness and headaches.   Hematological:  Does not bruise/bleed easily.   Psychiatric/Behavioral:  Negative for behavioral problems.    All other systems reviewed and are negative.      Objective   No results found for: \"HGBA1C\"   Pulse 73   Temp 36.6 °C (97.9 °F)   Ht 1.702 m (5' 7\")   Wt 142 kg (313 lb 12.8 oz)   SpO2 98%   BMI 49.15 kg/m²     Physical Exam  Vitals and nursing note reviewed.   Constitutional:       Appearance: Normal appearance. She is obese.   HENT:      Head: Normocephalic.      Nose: Nose normal.   Eyes:      Conjunctiva/sclera: Conjunctivae normal.      Pupils: Pupils are equal, round, and reactive to light.   Cardiovascular:      Rate and Rhythm: Regular rhythm.   Pulmonary:      Effort: Pulmonary effort is normal.      Breath sounds: Normal breath sounds.   Abdominal:      General: Abdomen is flat.      Palpations: Abdomen is soft.   Musculoskeletal:      Cervical back: Neck supple.   Skin:     General: Skin is warm.   Neurological:      General: No focal deficit present.      Mental Status: She is oriented to person, place, and time.   Psychiatric:         Mood and Affect: Mood normal.         Assessment/Plan   Facundo was seen today for nasal congestion, new patient visit and asthma.  Diagnoses and all orders for this visit:  Annual physical exam (Primary)  -     Albumin-Creatinine Ratio, Urine Random; Future  -     CBC and Auto Differential; Future  -     Comprehensive Metabolic Panel; Future  -     Hemoglobin A1C; Future  -     Lipid Panel; Future  -     Magnesium; Future  -     TSH with reflex to Free " T4 if abnormal; Future  -     Vitamin D 25-Hydroxy,Total (for eval of Vitamin D levels); Future  -     CBC and Auto Differential  -     Comprehensive Metabolic Panel  -     Hemoglobin A1C  -     Lipid Panel  -     Magnesium  -     TSH with reflex to Free T4 if abnormal  -     Vitamin D 25-Hydroxy,Total (for eval of Vitamin D levels)  Family history of breast cancer in first degree relative  -     Referral to High Risk Breast Cancer; Future  Mild intermittent asthma without complication (HHS-HCC)  -     mometasone-formoterol (Dulera) 100-5 mcg/actuation inhaler; Inhale 2 puffs 2 times a day. Rinse mouth with water after use to reduce aftertaste and incidence of candidiasis. Do not swallow.  -     XR chest 2 views; Future  History of syphilis  History of gonorrhea  History of cocaine abuse (Multi)  -     Drug Screen, Urine With Reflex to Confirmation; Future  -     Drug Screen, Urine With Reflex to Confirmation  Marijuana use  -     Drug Screen, Urine With Reflex to Confirmation; Future  -     Drug Screen, Urine With Reflex to Confirmation  Anxiety  Morbid obesity (Multi)  Sinusitis, unspecified chronicity, unspecified location  -     azithromycin (Zithromax) 250 mg tablet; Take 2 tablets (500 mg) by mouth once daily for 1 day, THEN 1 tablet (250 mg) once daily for 4 days. Take 2 tabs (500 mg) by mouth today, than 1 daily for 4 days..  Other orders  -     Follow Up In Primary Care - Established; Future   Patient was identified as a fall risk. Risk prevention instructions provided.  New patient to my office  Old records reviewed    Past medical history, history of treatment of syphilis penicillin IM every week x 3, treated with doxycycline for gonorrhea awaiting follow-up infectious disease at  in 6 months  - History of drug abuse cocaine methadone now patient drug-free  Using marijuana for anxiety  - Strong family history of breast cancer at young age and her mother age 25-30 her and history of brain cancer in her  aunt and ovarian cancer patient may have underlying BRCA mutation  Referred patient to high risk breast surgery for further eval recommendation    Annual preventive visit  - Vaccinations are reviewed and up-to-date  -Counseled about breast cancer risk due to strong family history referred patient high risk of breast surgery  -Morbid obesity counseled about weight loss low-carb diet  - Marijuana use counseled about abstinence  -Recent treatment for syphilis and gonorrhea comes about prevention and protection  - Proceed with complete blood work follow-up results closely    Follow-up  - Cough congestion for the last 2 days  Sinusitis start patient Z-Glenn  - Underlying history of asthma continue with Dulera inhaler obtain chest x-ray  Follow-up 4 weeks for further recommendation

## 2025-01-30 LAB
25(OH)D3+25(OH)D2 SERPL-MCNC: 24 NG/ML (ref 30–100)
ALBUMIN SERPL-MCNC: 4.3 G/DL (ref 3.6–5.1)
ALP SERPL-CCNC: 65 U/L (ref 31–125)
ALT SERPL-CCNC: 10 U/L (ref 6–29)
ANION GAP SERPL CALCULATED.4IONS-SCNC: 9 MMOL/L (CALC) (ref 7–17)
AST SERPL-CCNC: 15 U/L (ref 10–30)
BASOPHILS # BLD AUTO: 29 CELLS/UL (ref 0–200)
BASOPHILS NFR BLD AUTO: 0.3 %
BILIRUB SERPL-MCNC: 0.5 MG/DL (ref 0.2–1.2)
BUN SERPL-MCNC: 10 MG/DL (ref 7–25)
CALCIUM SERPL-MCNC: 9.3 MG/DL (ref 8.6–10.2)
CHLORIDE SERPL-SCNC: 104 MMOL/L (ref 98–110)
CHOLEST SERPL-MCNC: 183 MG/DL
CHOLEST/HDLC SERPL: 3.6 (CALC)
CO2 SERPL-SCNC: 25 MMOL/L (ref 20–32)
CREAT SERPL-MCNC: 0.47 MG/DL (ref 0.5–0.97)
EGFRCR SERPLBLD CKD-EPI 2021: 130 ML/MIN/1.73M2
EOSINOPHIL # BLD AUTO: 86 CELLS/UL (ref 15–500)
EOSINOPHIL NFR BLD AUTO: 0.9 %
ERYTHROCYTE [DISTWIDTH] IN BLOOD BY AUTOMATED COUNT: 11.9 % (ref 11–15)
EST. AVERAGE GLUCOSE BLD GHB EST-MCNC: 105 MG/DL
EST. AVERAGE GLUCOSE BLD GHB EST-SCNC: 5.8 MMOL/L
GLUCOSE SERPL-MCNC: 81 MG/DL (ref 65–99)
HBA1C MFR BLD: 5.3 % OF TOTAL HGB
HCT VFR BLD AUTO: 42.7 % (ref 35–45)
HDLC SERPL-MCNC: 51 MG/DL
HGB BLD-MCNC: 14.2 G/DL (ref 11.7–15.5)
LDLC SERPL CALC-MCNC: 114 MG/DL (CALC)
LYMPHOCYTES # BLD AUTO: 2976 CELLS/UL (ref 850–3900)
LYMPHOCYTES NFR BLD AUTO: 31 %
MAGNESIUM SERPL-MCNC: 2.2 MG/DL (ref 1.5–2.5)
MCH RBC QN AUTO: 31.2 PG (ref 27–33)
MCHC RBC AUTO-ENTMCNC: 33.3 G/DL (ref 32–36)
MCV RBC AUTO: 93.8 FL (ref 80–100)
MONOCYTES # BLD AUTO: 605 CELLS/UL (ref 200–950)
MONOCYTES NFR BLD AUTO: 6.3 %
NEUTROPHILS # BLD AUTO: 5904 CELLS/UL (ref 1500–7800)
NEUTROPHILS NFR BLD AUTO: 61.5 %
NONHDLC SERPL-MCNC: 132 MG/DL (CALC)
PLATELET # BLD AUTO: 325 THOUSAND/UL (ref 140–400)
PMV BLD REES-ECKER: 10.1 FL (ref 7.5–12.5)
POTASSIUM SERPL-SCNC: 4.4 MMOL/L (ref 3.5–5.3)
PROT SERPL-MCNC: 7.2 G/DL (ref 6.1–8.1)
RBC # BLD AUTO: 4.55 MILLION/UL (ref 3.8–5.1)
SODIUM SERPL-SCNC: 138 MMOL/L (ref 135–146)
TRIGL SERPL-MCNC: 82 MG/DL
TSH SERPL-ACNC: 1.57 MIU/L
WBC # BLD AUTO: 9.6 THOUSAND/UL (ref 3.8–10.8)

## 2025-02-21 ENCOUNTER — APPOINTMENT (OUTPATIENT)
Dept: OBSTETRICS AND GYNECOLOGY | Facility: CLINIC | Age: 30
End: 2025-02-21
Payer: MEDICAID

## 2025-02-27 ENCOUNTER — APPOINTMENT (OUTPATIENT)
Dept: PRIMARY CARE | Facility: CLINIC | Age: 30
End: 2025-02-27
Payer: MEDICAID

## 2025-02-27 VITALS
SYSTOLIC BLOOD PRESSURE: 116 MMHG | DIASTOLIC BLOOD PRESSURE: 64 MMHG | HEART RATE: 70 BPM | BODY MASS INDEX: 49.9 KG/M2 | TEMPERATURE: 97.9 F | OXYGEN SATURATION: 98 % | WEIGHT: 293 LBS

## 2025-02-27 DIAGNOSIS — F41.9 ANXIETY: ICD-10-CM

## 2025-02-27 DIAGNOSIS — N93.9 VAGINAL BLEEDING: Primary | ICD-10-CM

## 2025-02-27 DIAGNOSIS — F12.90 MARIJUANA USE: ICD-10-CM

## 2025-02-27 DIAGNOSIS — E66.01 MORBID OBESITY (MULTI): ICD-10-CM

## 2025-02-27 DIAGNOSIS — Z86.19 HISTORY OF SYPHILIS: ICD-10-CM

## 2025-02-27 DIAGNOSIS — Z86.19 HISTORY OF GONORRHEA: ICD-10-CM

## 2025-02-27 PROCEDURE — 99214 OFFICE O/P EST MOD 30 MIN: CPT | Performed by: INTERNAL MEDICINE

## 2025-02-27 PROCEDURE — 1036F TOBACCO NON-USER: CPT | Performed by: INTERNAL MEDICINE

## 2025-02-27 RX ORDER — BUDESONIDE AND FORMOTEROL FUMARATE DIHYDRATE 80; 4.5 UG/1; UG/1
2 AEROSOL RESPIRATORY (INHALATION)
COMMUNITY

## 2025-02-27 NOTE — PROGRESS NOTES
Subjective   Patient ID: Facundo Mar is a 29 y.o. female who presents for Results and Menstrual Problem (Has had spotting and double periods this month.  Spotting after sex, has had unprotected sex also so possibility of pregnancy, needs referral to GYN).    - Patient comes today for evaluation and recommendation regarding smoking this months unprotected sex possible pregnancy  Patient comes about daily for blood test for pregnancy refer patient to GYN for exam ordered today follow-up results closely  - Recent blood work reviewed with patient and plan of care  Continue with diet controlled on low-carb diet  - Past medical history, history of treatment of syphilis penicillin IM every week x 3, treated with doxycycline for gonorrhea awaiting follow-up infectious disease at  in 6 months..  - History of drug abuse cocaine methadone now patient drug-free  Using marijuana for anxiety  - Strong family history of breast cancer at young age and her mother age 25-30 her and history of brain cancer in her aunt and ovarian cancer patient may have underlying BRCA mutation.  Referred patient to high risk breast surgery for further eval recommendation   -Morbid obesity counseled about weight loss low-carb diet  - Marijuana use counseled about abstinence  -Recent treatment for syphilis and gonorrhea comes about prevention and protection  - Proceed with complete blood work follow-up results closely                Review of Systems   Genitourinary:  Positive for vaginal bleeding and vaginal pain.       Objective   Lab Results   Component Value Date    HGBA1C 5.3 01/29/2025      /64   Pulse 70   Temp 36.6 °C (97.9 °F)   Wt 145 kg (318 lb 9.6 oz)   SpO2 98%   BMI 49.90 kg/m²   Lab Results   Component Value Date    WBC 9.6 01/29/2025    HGB 14.2 01/29/2025    HCT 42.7 01/29/2025     01/29/2025    CHOL 183 01/29/2025    TRIG 82 01/29/2025    HDL 51 01/29/2025    ALT 10 01/29/2025    AST 15 01/29/2025    NA  138 01/29/2025    K 4.4 01/29/2025     01/29/2025    CREATININE 0.47 (L) 01/29/2025    BUN 10 01/29/2025    CO2 25 01/29/2025    TSH 1.57 01/29/2025    INR 1.6 (H) 02/08/2021    HGBA1C 5.3 01/29/2025     par   Physical Exam  Vitals and nursing note reviewed.   Constitutional:       Appearance: Normal appearance. She is obese.   HENT:      Head: Normocephalic.      Nose: Nose normal.   Eyes:      Conjunctiva/sclera: Conjunctivae normal.      Pupils: Pupils are equal, round, and reactive to light.   Cardiovascular:      Rate and Rhythm: Regular rhythm.   Pulmonary:      Effort: Pulmonary effort is normal.      Breath sounds: Normal breath sounds.   Abdominal:      General: Abdomen is flat.      Palpations: Abdomen is soft.   Musculoskeletal:      Cervical back: Neck supple.   Skin:     General: Skin is warm.   Neurological:      General: No focal deficit present.      Mental Status: She is oriented to person, place, and time.   Psychiatric:         Mood and Affect: Mood normal.         Assessment/Plan   Elexusnight was seen today for results and menstrual problem.  Diagnoses and all orders for this visit:  Vaginal bleeding (Primary)  -     [facility] HCG, TOTAL, QN; Future  -     Referral to Gynecology; Future  -     [facility] HCG, TOTAL, QN  History of syphilis  History of gonorrhea  Marijuana use  Anxiety  Morbid obesity (Multi)  Other orders  -     Follow Up In Primary Care - Established   - Patient comes today for evaluation and recommendation regarding smoking this months unprotected sex possible pregnancy  Patient comes about daily for blood test for pregnancy refer patient to GYN for exam ordered today follow-up results closely  - Recent blood work reviewed with patient and plan of care  Continue with diet controlled on low-carb diet  - Past medical history, history of treatment of syphilis penicillin IM every week x 3, treated with doxycycline for gonorrhea awaiting follow-up infectious disease at  in 6  months..  - History of drug abuse cocaine methadone now patient drug-free  Using marijuana for anxiety  - Strong family history of breast cancer at young age and her mother age 25-30 her and history of brain cancer in her aunt and ovarian cancer patient may have underlying BRCA mutation.  Referred patient to high risk breast surgery for further eval recommendation   -Morbid obesity counseled about weight loss low-carb diet  - Marijuana use counseled about abstinence  -Recent treatment for syphilis and gonorrhea comes about prevention and protection  - Proceed with complete blood work follow-up results closely

## 2025-02-28 LAB — B-HCG SERPL-ACNC: <5 MIU/ML

## 2025-03-11 ENCOUNTER — APPOINTMENT (OUTPATIENT)
Dept: INFECTIOUS DISEASES | Facility: CLINIC | Age: 30
End: 2025-03-11
Payer: MEDICAID

## 2025-03-12 ENCOUNTER — APPOINTMENT (OUTPATIENT)
Dept: INFECTIOUS DISEASES | Facility: CLINIC | Age: 30
End: 2025-03-12
Payer: MEDICAID

## 2025-05-12 ENCOUNTER — HOSPITAL ENCOUNTER (EMERGENCY)
Facility: HOSPITAL | Age: 30
Discharge: HOME | End: 2025-05-12
Payer: COMMERCIAL

## 2025-05-12 ENCOUNTER — APPOINTMENT (OUTPATIENT)
Dept: RADIOLOGY | Facility: HOSPITAL | Age: 30
End: 2025-05-12
Payer: COMMERCIAL

## 2025-05-12 VITALS
OXYGEN SATURATION: 98 % | TEMPERATURE: 98.4 F | HEART RATE: 83 BPM | WEIGHT: 260 LBS | HEIGHT: 67 IN | SYSTOLIC BLOOD PRESSURE: 165 MMHG | DIASTOLIC BLOOD PRESSURE: 92 MMHG | BODY MASS INDEX: 40.81 KG/M2 | RESPIRATION RATE: 15 BRPM

## 2025-05-12 DIAGNOSIS — S82.64XA CLOSED NONDISPLACED FRACTURE OF LATERAL MALLEOLUS OF RIGHT FIBULA, INITIAL ENCOUNTER: Primary | ICD-10-CM

## 2025-05-12 PROCEDURE — 73630 X-RAY EXAM OF FOOT: CPT | Mod: RIGHT SIDE | Performed by: RADIOLOGY

## 2025-05-12 PROCEDURE — 2500000004 HC RX 250 GENERAL PHARMACY W/ HCPCS (ALT 636 FOR OP/ED): Mod: JZ

## 2025-05-12 PROCEDURE — 73610 X-RAY EXAM OF ANKLE: CPT | Mod: RT

## 2025-05-12 PROCEDURE — 99284 EMERGENCY DEPT VISIT MOD MDM: CPT | Mod: 25

## 2025-05-12 PROCEDURE — 73610 X-RAY EXAM OF ANKLE: CPT | Mod: RIGHT SIDE | Performed by: RADIOLOGY

## 2025-05-12 PROCEDURE — 96372 THER/PROPH/DIAG INJ SC/IM: CPT

## 2025-05-12 PROCEDURE — 73630 X-RAY EXAM OF FOOT: CPT | Mod: RT

## 2025-05-12 PROCEDURE — 29515 APPLICATION SHORT LEG SPLINT: CPT | Mod: RT

## 2025-05-12 RX ORDER — KETOROLAC TROMETHAMINE 30 MG/ML
30 INJECTION, SOLUTION INTRAMUSCULAR; INTRAVENOUS ONCE
Status: COMPLETED | OUTPATIENT
Start: 2025-05-12 | End: 2025-05-12

## 2025-05-12 RX ADMIN — KETOROLAC TROMETHAMINE 30 MG: 30 INJECTION, SOLUTION INTRAMUSCULAR at 19:31

## 2025-05-12 ASSESSMENT — PAIN DESCRIPTION - LOCATION: LOCATION: ANKLE

## 2025-05-12 ASSESSMENT — PAIN SCALES - GENERAL
PAINLEVEL_OUTOF10: 8
PAINLEVEL_OUTOF10: 6

## 2025-05-12 ASSESSMENT — PAIN DESCRIPTION - FREQUENCY: FREQUENCY: CONSTANT/CONTINUOUS

## 2025-05-12 ASSESSMENT — PAIN DESCRIPTION - PROGRESSION: CLINICAL_PROGRESSION: NOT CHANGED

## 2025-05-12 ASSESSMENT — PAIN - FUNCTIONAL ASSESSMENT: PAIN_FUNCTIONAL_ASSESSMENT: 0-10

## 2025-05-12 ASSESSMENT — COLUMBIA-SUICIDE SEVERITY RATING SCALE - C-SSRS
2. HAVE YOU ACTUALLY HAD ANY THOUGHTS OF KILLING YOURSELF?: NO
1. IN THE PAST MONTH, HAVE YOU WISHED YOU WERE DEAD OR WISHED YOU COULD GO TO SLEEP AND NOT WAKE UP?: NO
6. HAVE YOU EVER DONE ANYTHING, STARTED TO DO ANYTHING, OR PREPARED TO DO ANYTHING TO END YOUR LIFE?: NO

## 2025-05-12 ASSESSMENT — PAIN DESCRIPTION - DESCRIPTORS: DESCRIPTORS: DISCOMFORT

## 2025-05-12 ASSESSMENT — PAIN DESCRIPTION - PAIN TYPE: TYPE: ACUTE PAIN

## 2025-05-12 ASSESSMENT — PAIN DESCRIPTION - ONSET: ONSET: ONGOING

## 2025-05-12 ASSESSMENT — PAIN DESCRIPTION - ORIENTATION: ORIENTATION: RIGHT

## 2025-05-12 NOTE — Clinical Note
Facundo Mar was seen and treated in our emergency department on 5/12/2025.  She may return to work on 05/13/2025.  No weight bearing until seen by ortho     If you have any questions or concerns, please don't hesitate to call.      Chrissy Church PA-C

## 2025-05-12 NOTE — ED TRIAGE NOTES
Pt arrives ambulatory using one crutch presenting with right ankle pain, that occurred after a mechanical fall around 1500 today. Pt states she was playing with her nephew and fell, rolling her ankle. Pt reports swelling and bruising to ankle. Pt able to bear minimal weight. Pt A&Ox4, resp easy and unlabored, skin of appropriate color.

## 2025-05-12 NOTE — ED PROVIDER NOTES
HPI   Chief Complaint   Patient presents with    Ankle Pain       Patient is a 30-year-old female presenting to the ED for right ankle pain times today.  Patient states she was playing with her nephew and when she ran after them she felt a pop and a pull afterwards she was unable to bear weight.  Patient states she injured her ankle a couple months ago after falling down some stairs and has been working out more feels like she is placed a lot of stress on her ankle.  Patient does endorse some numbness in the ankle radiating up her leg.  Patient denies any head injury or fall.  Patient has no other complaints.              Patient History   Medical History[1]  Surgical History[2]  Family History[3]  Social History[4]    Physical Exam   ED Triage Vitals [05/12/25 1746]   Temperature Heart Rate Respirations BP   37.1 °C (98.8 °F) 85 18 (!) 158/96      Pulse Ox Temp Source Heart Rate Source Patient Position   99 % Temporal Monitor Sitting      BP Location FiO2 (%)     Left arm --       Physical Exam  Cardiovascular:      Pulses: Normal pulses.   Pulmonary:      Effort: Pulmonary effort is normal.   Musculoskeletal:         General: Tenderness present. Normal range of motion.      Comments: Pain with palpation to the right ankle lateral malleolus and pain with plantarflexion of the right ankle.  No pain with flexion extension of the knee.  Rest of MSK is unremarkable.   Skin:     Capillary Refill: Capillary refill takes less than 2 seconds.   Neurological:      General: No focal deficit present.      Mental Status: She is alert and oriented to person, place, and time. Mental status is at baseline.      Sensory: No sensory deficit.           ED Course & MDM   Diagnoses as of 05/12/25 1913   Closed nondisplaced fracture of lateral malleolus of right fibula, initial encounter                 No data recorded     Jackson Coma Scale Score: 15 (05/12/25 1859 : Ariella Causey RN)                           Medical Decision  Making  Medical Decision Making:  Patient presented as described in HPI. Patient case including ROS, PE, and treatment and plan discussed with ED attending if attached as cosigner. Due to patients presentation orders completed include as documented.  Patient presents to the ED for right ankle injury today.  Patient given Toradol here.  Imaging shows mild lateral soft tissues swelling in the ankle questionable nondisplaced fracture of the lateral aspect of the distal fibula.  Patient educated his findings.  Patient placed in a sugar-tong by the medic.  Patient was neurovascularly intact prior and after splint was placed.  Patient has crutches already and will use those.  Patient educated on RICE therapy ibuprofen Tylenol alternating every 4 hours at home.  Educated close follow-up with orthopedics placed a referral.  Educated on any worsening symptoms to return.  Patient cam stable discharged  Patient was advised to follow up with PCP or recommended provider in 2-3 days for another evaluation and exam. I advised patient/guardian to return or go to closest emergency room immediately if symptoms change, get worse, new symptoms develop prior to follow up. If there is no improvement in symptoms in the next 24 hours they are advised to return for further evaluation and exam. I also explained the plan and treatment course. Patient/guardian is in agreement with plan, treatment course, and follow up and states verbally that they will comply.        Patient care discussed with: N/A  Social Determinants affecting care: N/A    Final diagnosis and disposition as below.  See CI    Homegoing. I discussed the differential; results and discharge plan with the patient and/or family/friend/caregiver if present.  I emphasized the importance of follow-up with the physician I referred them to in the timeframe recommended.  I explained reasons for the patient to return to the Emergency Department. They agreed that if they feel their  condition is worsening or if they have any other concern they should call 911 immediately for further assistance. I gave the patient an opportunity to ask all questions they had and answered all of them accordingly. They understand return precautions and discharge instructions. The patient and/or family/friend/caregiver expressed understanding verbally and that they would comply.       Disposition:  Discharge        This note has been transcribed using voice recognition and may contain grammatical errors, misplaced words, incorrect words, incorrect phrases or other errors.        XR ankle right 3+ views   Final Result   Mild lateral ankle soft tissue swelling with questionable   nondisplaced fracture lateral aspect of the distal fibula.        Signed by: Choco Horner 5/12/2025 6:49 PM   Dictation workstation:   [id number]      XR foot right 3+ views   Final Result   Mild lateral ankle soft tissue swelling with questionable   nondisplaced fracture lateral aspect of the distal fibula.        Signed by: Choco Horner 5/12/2025 6:49 PM   Dictation workstation:   [id number]           Procedure  Procedures       [1]   Past Medical History:  Diagnosis Date    Anxiety     Asthma    [2] History reviewed. No pertinent surgical history.  [3]   Family History  Problem Relation Name Age of Onset    No Known Problems Mother      No Known Problems Sister      No Known Problems Brother     [4]   Social History  Tobacco Use    Smoking status: Never    Smokeless tobacco: Never   Substance Use Topics    Alcohol use: Yes     Alcohol/week: 3.0 standard drinks of alcohol     Types: 3 Standard drinks or equivalent per week    Drug use: Yes     Types: Marijuana        Chrissy Church PA-C  05/12/25 1917

## 2025-05-12 NOTE — Clinical Note
Pt OK for d/c home per provider. All results and findings discussed with patient by provider. Home care and follow up instructions provided to patient. All questions answered. Pt verbalized understanding of all information. Pt A&Ox4, resp easy, skin  warm dry and of appropriate color. Pt brought crutch form home,

## 2025-05-19 ASSESSMENT — ENCOUNTER SYMPTOMS
CONSTITUTIONAL NEGATIVE: 1
ARTHRALGIAS: 1
MYALGIAS: 1
CARDIOVASCULAR NEGATIVE: 1
RESPIRATORY NEGATIVE: 1

## 2025-05-19 NOTE — PROGRESS NOTES
New patient  History Of Present Illness  Facundo Mar is a 30 y.o. female presenting with RIGHT ankle pain. She was seen at the [place] ED on 5/12/25 after feeling a pop in the affected R ankle after playing with her nephew. She had injured the R ankle after falling down stairs a few months prior. Imaging from ED revealed a nondisplaced distal fibular fracture. She was placed in a soft cast- sugar tong states she has had to re wrap a few times. Is also ambulating with crutches. States pain is an 8/10, will manage with tylenol 1000mg 1x/day and ibuprofen 860 mg 1x/day. Is experiencing numbness in her pink toe and sometimes tingling with elevation in last two digits. Today she is ambulating with a crutches and was brought in with clinic wheelchair. She states she has a hard time using crutches and will use on at home, will use both outside of home. She has help at home for ADLs. She states her sleep in uninterrupted and does not awaken from pain.  We reviewed patient x-ray results in detail.  Patient x-rays show small fracture of the lateral fibula.  Patient is able to bear weight with minimal to no pain except for some pain in the base of her heel which is likely a plantar fasciitis of which she has a history.  As such after discussion we will transition patient out of her splint and into a tall walking boot which she can use with weightbearing.  Patient can follow-up in 3 weeks for nolan-ray and reevaluation and we can adjust treatment as indicated at that time.  Patient and mother verbalized understanding and agreement with plan of care.    Past Medical History  She has a past medical history of Anxiety and Asthma.    Surgical History  She has no past surgical history on file.     Social History  She reports that she has never smoked. She has never used smokeless tobacco. She reports current alcohol use of about 3.0 standard drinks of alcohol per week. She reports current drug use. Drug: Marijuana.    Family  "History  Family History[1]     Allergies  Contact metal agent, Penicillins, and Estradiol    Review of Systems  Review of Systems   Constitutional: Negative.    Respiratory: Negative.     Cardiovascular: Negative.    Musculoskeletal:  Positive for arthralgias and myalgias.   All other systems reviewed and are negative.       Last Recorded Vitals  /86 (BP Location: Right arm, Patient Position: Sitting, BP Cuff Size: Large adult)   Pulse 85   Ht 1.702 m (5' 7\")   Wt 118 kg (260 lb)   LMP 04/15/2025   BMI 40.72 kg/m²      The , LISSY PATE was present during today's visit and not limited to physical examination!    Examination:  Right Lateral Ankle Foot  Edema: Positive.   Ecchymosis/Bruising: Positive.   Percussion Test: Positive  Fibula/Cuboid         Tuning Fork Test: Positive Fibula/Cuboid     Orientation:   Positive  Asymmetrical,because of the swelling.          ROM:   Positive, FROM with pain            Muscle Strength: Positive   +5/+5 Planar Flexion,   +5/+5  Dorsi Flexion,      +4/+5 Inversion  +4/+5 Eversion        +4/+5 Plantarflexion in combination with inversion  +4/+5 Plantarflexion in combination with eversion          +4/+5 Dorsiflexion in combination with inversion (Posterior Tibialis)  +4/+5 Dorsiflexion in combination with eversion (Peroneal Brevis)  +4/+5 Dorsiflexion in combination with eversion and flexion of great toe (Peroneal Longus).            Palpation:   Positive, Painful and Tender to Palpation Right lateral malleolus, distal fibula           Vascular:   +2/+4 Dorsalis Pedis  +2/+4 Posterior Tibialis  Capillary Refill < 2 Seconds.        Leg/Ankle/Foot - Ankle Impingement:  Posterior Ankle Impingement Test: Negative.   Anterior Ankle Impingement Test: Negative.         Leg/Ankle/Foot - Ankle Instability:  Flexibility Test: Negative  Anterior Drawer Test:  Negative.   Talar Tilt Test:  Negative.   External Rotation Kleiger Plantar Flexion Test: Negative  External " Rotation Kleiger Dorsiflexion Test:  Negative  Squeeze Test:  Positive  Dorsiflexion Test:  Negative.   Posterior Tibial Instability Test: Negative.  Peroneal Tendon Instability Test:  Negative.  Horizontal Squeeze Test:  Positive.   Vertical Squeeze Test:  Positive.   Standing/Walking Test: Negative       Leg/Ankle/Foot - DVT:  Brody's Sign: Negative.         Leg/Ankle/Foot - Forefoot:  Strunsky Test:  Negative.   Gaenslen Maneuver Test:  Negative.   Metatarsal Tap Test: Negative.  Crepitation Test:  Negative.         Leg/Ankle/Foot - Hindfoot Achilles/Calcaneus:  Day Compression Test: Negative.   Hoffa Sign: Negative.   Achilles Tendon Tap Test: Negative.   Heel Compression Test: Negative.         Leg/Ankle/Foot - Nerve Irritation:  Yamileth Sign: Negative.   Digital Nerve Stretch Test: Negative.   Tinel Sign: Negative.   Tourniquet Sign: Negative.         Feet/Foot:   Positive BILATERAL  Valgus foot     Imaging and Diagnostics Review:  Previously ordered radiographs dependently reviewed    Assessment   1. Closed traumatic nondisplaced fracture of distal end of right fibula, initial encounter    2. Right ankle pain, unspecified chronicity    3. Right ankle injury, initial encounter    4. High ankle sprain, right, initial encounter    5. Syndesmotic disruption of ankle, right, initial encounter    6. Closed nondisplaced fracture of lateral malleolus of right fibula, initial encounter        Plan   Treatment or Intervention:  May use PRICE therapy as needed.  Start into Physical Therapy 1-2 times a week for 8-10 weeks with manual therapy as well as dry needling and IASTM  Stressed the importance of wearing shoes with good stability control to help with the biomechanics affecting the lower legs  Stressed the importance of wearing full foot insoles to help with the biomechanics affecting the lower legs  Recommendation over-the-counter calcium 500mg, 3 times a day with vitamin-D3 6774-6050+ units a day with food as  well as a daily multivitamin  Recommendation over-the-counter Move Free for joint health  May take OTC Tylenol Extra Strength or OTC Tylenol Arthritis, taking one every 6-8 hours with food as needed for pain management.  Patient advised regarding the risk and/or potential adverse reactions and/or side effects of any prescribed medications along with any over-the-counter medications or any supplements used. Patient advised to seek immediate medical care if any adverse reactions occur. The patient and/or patient(s) parent(s) verbalized their understanding.  Possibility in future of MRI of RIGHT ANKLE to rule out tendon vs ligament vs tear vs fracture vs other, MSK to read.  Patient was given a Tall Walking boot brace for their RIGHT ANKLE injury/condition. The patient is ambulatory with or without aid and feels more stable with the brace on. The brace definitely helps improve their function as well as stability. Verbal and written instructions for the use, wear schedule, cleaning and application of this brace were given. Patient was instructed that should the brace result in increased pain, decreased sensation, numbness and/or tingling, increased swelling, or an overall worsening of their medical condition; to please contact our office immediately. Orthotic/brace management and training was provided for skin care, modifications due to healing tissues, edema changes, interruption in skin integrity and safety precautions with the Orthosis/brace.   Follow up in 3 week with Xray    Diagnostic studies:    XR ankle right 3+ views, XR foot right 3+ views  Narrative: Interpreted By:  Choco Horner,   STUDY:  XR ANKLE RIGHT 3+ VIEWS; XR FOOT RIGHT 3+ VIEWS; ;  5/12/2025 6:33 pm      INDICATION:  Signs/Symptoms:right ankle injury; Signs/Symptoms:right foot injury.      COMPARISON:  None.      ACCESSION NUMBER(S):  [id number]; [id number]      ORDERING CLINICIAN:  KEVAN VELAZCO      FINDINGS:  Questionable  nondisplaced fracture along the lateral aspect of the  distal fibula. Mild lateral ankle soft tissue swelling. Chronic  appearing ossification along the dorsal aspect of the navicular.      Impression: Mild lateral ankle soft tissue swelling with questionable  nondisplaced fracture lateral aspect of the distal fibula.      Signed by: Choco Horner 5/12/2025 6:49 PM  Dictation workstation:   [id number]       Activity Instructions, Restrictions, and Accommodations:      Consultations/Referrals:  Physical therapy    Follow-up:  Follow up 3 weeks  Total appointment time _30_ minutes. Greater than 50% spent counseling patient on results of physical exam, treatment options as well as results of ordered imaging and treatment for results, need for PT and expected outcomes, as well as discussing possible medications.      Logan Del Valle CNP           [1]   Family History  Problem Relation Name Age of Onset    No Known Problems Mother      No Known Problems Sister      No Known Problems Brother

## 2025-05-19 NOTE — PATIENT INSTRUCTIONS
May use PRICE therapy as needed.  Start into Physical Therapy 1-2 times a week for 8-10 weeks with manual therapy as well as dry needling and IASTM  Stressed the importance of wearing shoes with good stability control to help with the biomechanics affecting the lower legs  Stressed the importance of wearing full foot insoles to help with the biomechanics affecting the lower legs  Recommendation over-the-counter calcium 500mg, 3 times a day with vitamin-D3 1226-3493+ units a day with food as well as a daily multivitamin  Recommendation over-the-counter Move Free for joint health  May take OTC Tylenol Extra Strength or OTC Tylenol Arthritis, taking one every 6-8 hours with food as needed for pain management.  Patient advised regarding the risk and/or potential adverse reactions and/or side effects of any prescribed medications along with any over-the-counter medications or any supplements used. Patient advised to seek immediate medical care if any adverse reactions occur. The patient and/or patient(s) parent(s) verbalized their understanding.  Possibility in future of MRI of RIGHT ANKLE to rule out tendon vs ligament vs tear vs fracture vs other, MSK to read.  Patient was given a Tall Walking boot brace for their RIGHT ANKLE injury/condition. The patient is ambulatory with or without aid and feels more stable with the brace on. The brace definitely helps improve their function as well as stability. Verbal and written instructions for the use, wear schedule, cleaning and application of this brace were given. Patient was instructed that should the brace result in increased pain, decreased sensation, numbness and/or tingling, increased swelling, or an overall worsening of their medical condition; to please contact our office immediately. Orthotic/brace management and training was provided for skin care, modifications due to healing tissues, edema changes, interruption in skin integrity and safety precautions with the  Orthosis/brace.   Follow up in 3 week with Xray

## 2025-05-20 ENCOUNTER — APPOINTMENT (OUTPATIENT)
Dept: ORTHOPEDIC SURGERY | Facility: CLINIC | Age: 30
End: 2025-05-20
Payer: COMMERCIAL

## 2025-05-21 ENCOUNTER — APPOINTMENT (OUTPATIENT)
Dept: ORTHOPEDIC SURGERY | Facility: CLINIC | Age: 30
End: 2025-05-21
Payer: COMMERCIAL

## 2025-05-21 VITALS
DIASTOLIC BLOOD PRESSURE: 86 MMHG | BODY MASS INDEX: 40.81 KG/M2 | HEART RATE: 85 BPM | WEIGHT: 260 LBS | HEIGHT: 67 IN | SYSTOLIC BLOOD PRESSURE: 130 MMHG

## 2025-05-21 DIAGNOSIS — S93.491A HIGH ANKLE SPRAIN, RIGHT, INITIAL ENCOUNTER: ICD-10-CM

## 2025-05-21 DIAGNOSIS — S82.831A CLOSED TRAUMATIC NONDISPLACED FRACTURE OF DISTAL END OF RIGHT FIBULA, INITIAL ENCOUNTER: ICD-10-CM

## 2025-05-21 DIAGNOSIS — M25.571 RIGHT ANKLE PAIN, UNSPECIFIED CHRONICITY: ICD-10-CM

## 2025-05-21 DIAGNOSIS — S82.64XA CLOSED NONDISPLACED FRACTURE OF LATERAL MALLEOLUS OF RIGHT FIBULA, INITIAL ENCOUNTER: ICD-10-CM

## 2025-05-21 DIAGNOSIS — S99.911A RIGHT ANKLE INJURY, INITIAL ENCOUNTER: ICD-10-CM

## 2025-05-21 DIAGNOSIS — S93.431A SYNDESMOTIC DISRUPTION OF ANKLE, RIGHT, INITIAL ENCOUNTER: ICD-10-CM

## 2025-05-21 PROCEDURE — 99213 OFFICE O/P EST LOW 20 MIN: CPT | Mod: 25 | Performed by: NURSE PRACTITIONER

## 2025-05-21 PROCEDURE — 29505 APPLICATION LONG LEG SPLINT: CPT | Performed by: NURSE PRACTITIONER

## 2025-05-21 ASSESSMENT — PATIENT HEALTH QUESTIONNAIRE - PHQ9
2. FEELING DOWN, DEPRESSED OR HOPELESS: NOT AT ALL
1. LITTLE INTEREST OR PLEASURE IN DOING THINGS: NOT AT ALL
SUM OF ALL RESPONSES TO PHQ9 QUESTIONS 1 AND 2: 0

## 2025-05-21 ASSESSMENT — PAIN SCALES - GENERAL: PAINLEVEL_OUTOF10: 8

## 2025-05-21 ASSESSMENT — ENCOUNTER SYMPTOMS
DEPRESSION: 0
OCCASIONAL FEELINGS OF UNSTEADINESS: 0

## 2025-06-06 ASSESSMENT — ENCOUNTER SYMPTOMS
MYALGIAS: 1
ARTHRALGIAS: 1
CONSTITUTIONAL NEGATIVE: 1
RESPIRATORY NEGATIVE: 1
CARDIOVASCULAR NEGATIVE: 1

## 2025-06-06 NOTE — PROGRESS NOTES
Established patient  History Of Present Illness  Facundo Mar is a 30 y.o. female presenting for her follow up REXRAY of her RIGHT ankle. Presents wearing tall walking boot as previously fitted for and instructed to wear. Since last visit in office, patient states that she feels about the same. Rates current pain as a 8/10 but notes it was mostly being moved around for her xrays. Notes that prior to having her xrays she was at a 5/10.  We reviewed patient x-ray results in detail.  Patient x-rays show continued healing over area of concern albeit patient does continue to have tenderness over this area.  Patient also continues to have symptoms of a lateral ankle sprain as well as recently complaining of signs of plantar fasciitis.  Patient is not started physical therapy yet but will do so soon as she is able.  Advised patient to remove her boot daily 2-3 times in order to do range of motion exercises.  Patient can follow-up in 4 weeks for nolan-ray and reevaluation and we can consider further treatment options at that time such as MRI or CT is indicated.  Patient verbalizes understanding agreement plan of care.    Past Medical History  She has a past medical history of Anxiety and Asthma.    Surgical History  She has no past surgical history on file.     Social History  She reports that she has never smoked. She has never used smokeless tobacco. She reports current alcohol use of about 3.0 standard drinks of alcohol per week. She reports current drug use. Drug: Marijuana.    Family History  Family History[1]     Allergies  Contact metal agent, Penicillins, and Estradiol    Review of Systems  Review of Systems   Constitutional: Negative.    Respiratory: Negative.     Cardiovascular: Negative.    Musculoskeletal:  Positive for arthralgias and myalgias.   All other systems reviewed and are negative.    Last Recorded Vitals  /86 (BP Location: Right arm, Patient Position: Sitting, BP Cuff Size: Large adult)   " Pulse 83   Ht 1.702 m (5' 7\")   Wt 118 kg (260 lb)   LMP 04/15/2025   BMI 40.72 kg/m²      The , LISSY PATE was present during today's visit and not limited to physical examination!    Examination:  Right Lateral Ankle Foot  Edema: Negative  Ecchymosis/Bruising: Positive.   Percussion Test: Positive  Fibula/Cuboid         Tuning Fork Test: Positive Fibula/Cuboid     Orientation:   Negative         ROM:   Positive, FROM with pain with eversion/inversion           Muscle Strength: Positive   +5/+5 Planar Flexion,   +5/+5  Dorsi Flexion,      +4/+5 Inversion  +4/+5 Eversion        +4/+5 Plantarflexion in combination with inversion  +4/+5 Plantarflexion in combination with eversion          +4/+5 Dorsiflexion in combination with inversion (Posterior Tibialis)  +4/+5 Dorsiflexion in combination with eversion (Peroneal Brevis)  +4/+5 Dorsiflexion in combination with eversion and flexion of great toe (Peroneal Longus).            Palpation:   Positive, Painful and Tender to Palpation Right lateral malleolus, distal fibula           Vascular:   +2/+4 Dorsalis Pedis  +2/+4 Posterior Tibialis  Capillary Refill < 2 Seconds.        Leg/Ankle/Foot - Ankle Impingement:  Posterior Ankle Impingement Test: Negative.   Anterior Ankle Impingement Test: Negative.         Leg/Ankle/Foot - Ankle Instability:  Flexibility Test: Negative  Anterior Drawer Test:  Negative.   Talar Tilt Test:  Negative.   External Rotation Kleiger Plantar Flexion Test: Negative  External Rotation Kleiger Dorsiflexion Test:  Negative  Squeeze Test:  Positive  Dorsiflexion Test:  Negative.   Posterior Tibial Instability Test: Negative.  Peroneal Tendon Instability Test:  Negative.  Horizontal Squeeze Test:  Positive.   Vertical Squeeze Test:  Positive.   Standing/Walking Test: Negative       Leg/Ankle/Foot - DVT:  Brody's Sign: Negative.         Leg/Ankle/Foot - Forefoot:  Strunsky Test:  Negative.   Gaenslen Maneuver Test:  Negative. "   Metatarsal Tap Test: Negative.  Crepitation Test:  Negative.         Leg/Ankle/Foot - Hindfoot Achilles/Calcaneus:  Day Compression Test: Negative.   Hoffa Sign: Negative.   Achilles Tendon Tap Test: Negative.   Heel Compression Test: Negative.         Leg/Ankle/Foot - Nerve Irritation:  Yamileth Sign: Negative.   Digital Nerve Stretch Test: Negative.   Tinel Sign: Negative.   Tourniquet Sign: Negative.         Feet/Foot:   Positive BILATERAL  Valgus foot     Imaging and Diagnostics Review:  Ordered radiographs show continued healing of previously established fracture with increased callus formation.    Assessment   1. Closed traumatic nondisplaced fracture of distal end of right fibula with routine healing, subsequent encounter    2. Right ankle pain, unspecified chronicity    3. Injury of right ankle, subsequent encounter    4. High ankle sprain, right, subsequent encounter    5. Syndesmotic disruption of ankle, right, subsequent encounter    6. Closed nondisplaced fracture of lateral malleolus of right fibula with routine healing, subsequent encounter          Plan   Treatment or Intervention:  May use PRICE therapy as needed.  Start into Physical Therapy 1-2 times a week for 8-10 weeks with manual therapy as well as dry needling and IASTM  Again stressed the importance of wearing shoes with good stability control to help with the biomechanics affecting the lower legs  Again stressed the importance of wearing full foot insoles to help with the biomechanics affecting the lower legs  Recommendation over-the-counter calcium 500mg, 3 times a day with vitamin-D3 0081-5991+ units a day with food as well as a daily multivitamin  Recommendation over-the-counter Move Free for joint health  May take OTC Tylenol Extra Strength or OTC Tylenol Arthritis, taking one every 6-8 hours with food as needed for pain management.  Patient advised regarding the risk and/or potential adverse reactions and/or side effects of any  prescribed medications along with any over-the-counter medications or any supplements used. Patient advised to seek immediate medical care if any adverse reactions occur. The patient and/or patient(s) parent(s) verbalized their understanding.  Possibility in future of MRI of RIGHT ANKLE to rule out tendon vs ligament vs tear vs fracture vs other, MSK to read.  Patient to continue with Tall Walking boot brace for their RIGHT ANKLE injury/condition.     Diagnostic studies:  XR ankle right 3+ views, XR foot right 3+ views  Narrative: Interpreted By:  Choco Horner,   STUDY:  XR ANKLE RIGHT 3+ VIEWS; XR FOOT RIGHT 3+ VIEWS; ;  5/12/2025 6:33 pm      INDICATION:  Signs/Symptoms:right ankle injury; Signs/Symptoms:right foot injury.      COMPARISON:  None.      ACCESSION NUMBER(S):  [id number]; [id number]      ORDERING CLINICIAN:  KEVAN VELAZCO      FINDINGS:  Questionable nondisplaced fracture along the lateral aspect of the  distal fibula. Mild lateral ankle soft tissue swelling. Chronic  appearing ossification along the dorsal aspect of the navicular.      Impression: Mild lateral ankle soft tissue swelling with questionable  nondisplaced fracture lateral aspect of the distal fibula.      Signed by: Choco Horner 5/12/2025 6:49 PM  Dictation workstation:   [id number]       Activity Instructions, Restrictions, and Accommodations:      Consultations/Referrals:  Physical therapy    Follow-up:  Follow up 4 weeks jen  Total appointment time _30_ minutes. Greater than 50% spent counseling patient on results of physical exam, treatment options as well as results of ordered imaging and treatment for results, need for PT and expected outcomes, as well as discussing possible medications.    Logan Del Valle CNP           [1]   Family History  Problem Relation Name Age of Onset    No Known Problems Mother      No Known Problems Sister      No Known Problems Brother

## 2025-06-06 NOTE — PATIENT INSTRUCTIONS
May use PRICE therapy as needed.  Start into Physical Therapy 1-2 times a week for 8-10 weeks with manual therapy as well as dry needling and IASTM  Again stressed the importance of wearing shoes with good stability control to help with the biomechanics affecting the lower legs  Again stressed the importance of wearing full foot insoles to help with the biomechanics affecting the lower legs  Recommendation over-the-counter calcium 500mg, 3 times a day with vitamin-D3 7210-4789+ units a day with food as well as a daily multivitamin  Recommendation over-the-counter Move Free for joint health  May take OTC Tylenol Extra Strength or OTC Tylenol Arthritis, taking one every 6-8 hours with food as needed for pain management.  Patient advised regarding the risk and/or potential adverse reactions and/or side effects of any prescribed medications along with any over-the-counter medications or any supplements used. Patient advised to seek immediate medical care if any adverse reactions occur. The patient and/or patient(s) parent(s) verbalized their understanding.  Possibility in future of MRI of RIGHT ANKLE to rule out tendon vs ligament vs tear vs fracture vs other, MSK to read.  Patient to continue with Tall Walking boot brace for their RIGHT ANKLE injury/condition.   Follow up 4 weeks jen

## 2025-06-11 ENCOUNTER — APPOINTMENT (OUTPATIENT)
Dept: ORTHOPEDIC SURGERY | Facility: CLINIC | Age: 30
End: 2025-06-11
Payer: COMMERCIAL

## 2025-06-11 ENCOUNTER — HOSPITAL ENCOUNTER (OUTPATIENT)
Dept: RADIOLOGY | Facility: CLINIC | Age: 30
Discharge: HOME | End: 2025-06-11
Payer: COMMERCIAL

## 2025-06-11 VITALS
BODY MASS INDEX: 40.81 KG/M2 | HEART RATE: 83 BPM | WEIGHT: 260 LBS | HEIGHT: 67 IN | DIASTOLIC BLOOD PRESSURE: 86 MMHG | SYSTOLIC BLOOD PRESSURE: 130 MMHG

## 2025-06-11 DIAGNOSIS — S99.911D INJURY OF RIGHT ANKLE, SUBSEQUENT ENCOUNTER: ICD-10-CM

## 2025-06-11 DIAGNOSIS — S93.491D HIGH ANKLE SPRAIN, RIGHT, SUBSEQUENT ENCOUNTER: ICD-10-CM

## 2025-06-11 DIAGNOSIS — M25.571 RIGHT ANKLE PAIN, UNSPECIFIED CHRONICITY: ICD-10-CM

## 2025-06-11 DIAGNOSIS — S82.64XD CLOSED NONDISPLACED FRACTURE OF LATERAL MALLEOLUS OF RIGHT FIBULA WITH ROUTINE HEALING, SUBSEQUENT ENCOUNTER: ICD-10-CM

## 2025-06-11 DIAGNOSIS — S93.431D SYNDESMOTIC DISRUPTION OF ANKLE, RIGHT, SUBSEQUENT ENCOUNTER: ICD-10-CM

## 2025-06-11 DIAGNOSIS — S82.831D CLOSED TRAUMATIC NONDISPLACED FRACTURE OF DISTAL END OF RIGHT FIBULA WITH ROUTINE HEALING, SUBSEQUENT ENCOUNTER: ICD-10-CM

## 2025-06-11 PROCEDURE — 99214 OFFICE O/P EST MOD 30 MIN: CPT | Performed by: NURSE PRACTITIONER

## 2025-06-11 PROCEDURE — 73610 X-RAY EXAM OF ANKLE: CPT | Mod: RT

## 2025-06-11 PROCEDURE — 1036F TOBACCO NON-USER: CPT | Performed by: NURSE PRACTITIONER

## 2025-06-11 PROCEDURE — 73610 X-RAY EXAM OF ANKLE: CPT | Mod: RIGHT SIDE | Performed by: RADIOLOGY

## 2025-06-11 PROCEDURE — 3008F BODY MASS INDEX DOCD: CPT | Performed by: NURSE PRACTITIONER

## 2025-06-11 ASSESSMENT — PAIN SCALES - GENERAL: PAINLEVEL_OUTOF10: 8

## 2025-06-11 ASSESSMENT — ENCOUNTER SYMPTOMS
LOSS OF SENSATION IN FEET: 0
DEPRESSION: 0
OCCASIONAL FEELINGS OF UNSTEADINESS: 0

## 2025-06-11 ASSESSMENT — PATIENT HEALTH QUESTIONNAIRE - PHQ9
1. LITTLE INTEREST OR PLEASURE IN DOING THINGS: NOT AT ALL
2. FEELING DOWN, DEPRESSED OR HOPELESS: NOT AT ALL
SUM OF ALL RESPONSES TO PHQ9 QUESTIONS 1 AND 2: 0

## 2025-06-26 ASSESSMENT — ENCOUNTER SYMPTOMS
CONSTITUTIONAL NEGATIVE: 1
ARTHRALGIAS: 1
CARDIOVASCULAR NEGATIVE: 1
MYALGIAS: 1
RESPIRATORY NEGATIVE: 1

## 2025-06-26 NOTE — PATIENT INSTRUCTIONS
May use PRICE therapy as needed.  Continue home stretches since PT was denied  Again stressed the importance of wearing shoes with good stability control to help with the biomechanics affecting the lower legs  Again stressed the importance of wearing full foot insoles to help with the biomechanics affecting the lower legs  Recommendation over-the-counter calcium 500mg, 3 times a day with vitamin-D3 2251-4758+ units a day with food as well as a daily multivitamin  Recommendation over-the-counter Move Free for joint health  May take OTC Tylenol Extra Strength or OTC Tylenol Arthritis, taking one every 6-8 hours with food as needed for pain management.  Patient advised regarding the risk and/or potential adverse reactions and/or side effects of any prescribed medications along with any over-the-counter medications or any supplements used. Patient advised to seek immediate medical care if any adverse reactions occur. The patient and/or patient(s) parent(s) verbalized their understanding.  Possibility in future of MRI of RIGHT ANKLE to rule out tendon vs ligament vs tear vs fracture vs other, MSK to read.  Patient to wean out of wearing tall walking boot using pain as guide  Follow up as needed   Name band;

## 2025-06-26 NOTE — PROGRESS NOTES
"Established patient  History Of Present Illness  Facundo Mar is a 30 y.o. female presenting for her follow up REXRAY of her RIGHT ankle. Presents wearing tall walking boot as previously fitted for and instructed to wear. Since last visit in office, patient states that she feels improvement in ankle pain but heel is hurting more. 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 Rates pain today as 4/10 in ankle and 8/10 in heel. Taking ibuprofen and tylenol daily for pain with mild relief.  We reviewed patient x-ray results in detail.  Patient x-rays show resolution of patient's previously established fracture.  Upon exam patient has indications of a flexor and peroneal tendinitis as well as a plantar fasciitis.  Advised patient to wear supportive footwear and focus on stretching and strengthening exercises which she was provided.  Patient can follow-up as needed for continued or further complaints of pain or disability.  Patient and mother verbalized understanding agreement with plan of care.    Past Medical History  She has a past medical history of Anxiety and Asthma.    Surgical History  She has no past surgical history on file.     Social History  She reports that she has never smoked. She has never used smokeless tobacco. She reports current alcohol use of about 3.0 standard drinks of alcohol per week. She reports current drug use. Drug: Marijuana.    Family History  Family History[1]     Allergies  Contact metal agent, Penicillins, and Estradiol    Review of Systems  Review of Systems   Constitutional: Negative.    Respiratory: Negative.     Cardiovascular: Negative.    Musculoskeletal:  Positive for arthralgias and myalgias.   All other systems reviewed and are negative.    Last Recorded Vitals  /82 (BP Location: Left arm)   Pulse 79   Ht 1.702 m (5' 7\")   Wt 118 kg (260 lb)   LMP 05/01/2025 (Approximate)   BMI " 40.72 kg/m²      The , LISSY PATE was present during today's visit and not limited to physical examination!    Examination:  Right Lateral Ankle Foot  Edema: Negative  Ecchymosis/Bruising: Negative  Percussion Test: Negative  Tuning Fork Test: Negative    Orientation:   Negative         ROM:   Negative           Muscle Strength:   +5/+5 Planar Flexion,   +5/+5  Dorsi Flexion,      +5/+5 Inversion  +5/+5 Eversion        +5/+5 Plantarflexion in combination with inversion  +5/+5 Plantarflexion in combination with eversion          +5/+5 Dorsiflexion in combination with inversion (Posterior Tibialis)  +5/+5 Dorsiflexion in combination with eversion (Peroneal Brevis)  +5/+5 Dorsiflexion in combination with eversion and flexion of great toe (Peroneal Longus).            Palpation:   Positive, Painful and Tender to Palpation Right peroneal tendon, flexor tendon, plantar calcaneus           Vascular:   +2/+4 Dorsalis Pedis  +2/+4 Posterior Tibialis  Capillary Refill < 2 Seconds.        Leg/Ankle/Foot - Ankle Impingement:  Posterior Ankle Impingement Test: Negative.   Anterior Ankle Impingement Test: Negative.         Leg/Ankle/Foot - Ankle Instability:  Flexibility Test: Negative  Anterior Drawer Test:  Negative.   Talar Tilt Test:  Negative.   External Rotation Kleiger Plantar Flexion Test: Negative  External Rotation Kleiger Dorsiflexion Test:  Negative  Squeeze Test: Negative  Dorsiflexion Test:  Negative.   Posterior Tibial Instability Test: Negative.  Peroneal Tendon Instability Test:  Negative.  Horizontal Squeeze Test: Negative  Vertical Squeeze Test: Negative  Standing/Walking Test: Negative       Leg/Ankle/Foot - DVT:  Brody's Sign: Negative.         Leg/Ankle/Foot - Forefoot:  Strunsky Test:  Negative.   Gaenslen Maneuver Test:  Negative.   Metatarsal Tap Test: Negative.  Crepitation Test:  Negative.         Leg/Ankle/Foot - Hindfoot Achilles/Calcaneus:  Day Compression Test: Negative.   Jermaine  Sign: Negative.   Achilles Tendon Tap Test: Negative.   Heel Compression Test: Negative.         Leg/Ankle/Foot - Nerve Irritation:  Yamileth Sign: Negative.   Digital Nerve Stretch Test: Negative.   Tinel Sign: Negative.   Tourniquet Sign: Negative.         Feet/Foot:   Positive BILATERAL  Valgus foot     Imaging and Diagnostics Review:  Ordered radiographs show continued healing of previously established fracture.    Assessment   1. Closed traumatic nondisplaced fracture of distal end of right fibula with routine healing, subsequent encounter    2. Right ankle pain, unspecified chronicity    3. Injury of right ankle, subsequent encounter    4. High ankle sprain, right, subsequent encounter    5. Syndesmotic disruption of ankle, right, subsequent encounter    6. Closed nondisplaced fracture of lateral malleolus of right fibula with routine healing, subsequent encounter    7. Peroneal tendinitis of right lower leg    8. Plantar fasciitis of right foot        Plan   Treatment or Intervention:  May use PRICE therapy as needed.  Start into Physical Therapy 1-2 times a week for 8-10 weeks with manual therapy as well as dry needling and IASTM  Again stressed the importance of wearing shoes with good stability control to help with the biomechanics affecting the lower legs  Again stressed the importance of wearing full foot insoles to help with the biomechanics affecting the lower legs  Recommendation over-the-counter calcium 500mg, 3 times a day with vitamin-D3 4082-0363+ units a day with food as well as a daily multivitamin  Recommendation over-the-counter Move Free for joint health  May take OTC Tylenol Extra Strength or OTC Tylenol Arthritis, taking one every 6-8 hours with food as needed for pain management.  Patient advised regarding the risk and/or potential adverse reactions and/or side effects of any prescribed medications along with any over-the-counter medications or any supplements used. Patient advised to seek  immediate medical care if any adverse reactions occur. The patient and/or patient(s) parent(s) verbalized their understanding.  Possibility in future of MRI of RIGHT ANKLE to rule out tendon vs ligament vs tear vs fracture vs other, MSK to read.  Patient to continue with Tall Walking boot brace for their RIGHT ANKLE injury/condition.     Diagnostic studies:  XR ankle right 3+ views  Narrative: Interpreted By:  Hardik Waldron,   STUDY:  XR ANKLE RIGHT 3+ VIEWS      INDICATION:  Signs/Symptoms:RIGHT ANKLE PAIN.      COMPARISON:  May 12, 2025      ACCESSION NUMBER(S):  [id number]      ORDERING CLINICIAN:  CRUZ MITCHELL      FINDINGS:  No osseous, articular, or soft tissue abnormality identified.      Impression: Normal radiographs right ankle.      Signed by: Hardik Waldron 6/12/2025 7:09 AM  Dictation workstation:   [id number]       Activity Instructions, Restrictions, and Accommodations:      Consultations/Referrals:  Physical therapy    Follow-up:  Follow up as needed  Total appointment time _30_ minutes. Greater than 50% spent counseling patient on results of physical exam, treatment options as well as results of ordered imaging and treatment for results, need for PT and expected outcomes, as well as discussing possible medications.    Cruz Mitchell CNP             [1]   Family History  Problem Relation Name Age of Onset    No Known Problems Mother      No Known Problems Sister      No Known Problems Brother

## 2025-07-07 ENCOUNTER — HOSPITAL ENCOUNTER (OUTPATIENT)
Dept: RADIOLOGY | Facility: CLINIC | Age: 30
Discharge: HOME | End: 2025-07-07
Payer: COMMERCIAL

## 2025-07-07 ENCOUNTER — OFFICE VISIT (OUTPATIENT)
Dept: ORTHOPEDIC SURGERY | Facility: CLINIC | Age: 30
End: 2025-07-07
Payer: COMMERCIAL

## 2025-07-07 VITALS
DIASTOLIC BLOOD PRESSURE: 82 MMHG | SYSTOLIC BLOOD PRESSURE: 117 MMHG | WEIGHT: 260 LBS | HEART RATE: 79 BPM | HEIGHT: 67 IN | BODY MASS INDEX: 40.81 KG/M2

## 2025-07-07 DIAGNOSIS — S99.911D INJURY OF RIGHT ANKLE, SUBSEQUENT ENCOUNTER: ICD-10-CM

## 2025-07-07 DIAGNOSIS — M25.571 RIGHT ANKLE PAIN, UNSPECIFIED CHRONICITY: ICD-10-CM

## 2025-07-07 DIAGNOSIS — S82.831D CLOSED TRAUMATIC NONDISPLACED FRACTURE OF DISTAL END OF RIGHT FIBULA WITH ROUTINE HEALING, SUBSEQUENT ENCOUNTER: ICD-10-CM

## 2025-07-07 DIAGNOSIS — S82.64XD CLOSED NONDISPLACED FRACTURE OF LATERAL MALLEOLUS OF RIGHT FIBULA WITH ROUTINE HEALING, SUBSEQUENT ENCOUNTER: ICD-10-CM

## 2025-07-07 DIAGNOSIS — S93.431D SYNDESMOTIC DISRUPTION OF ANKLE, RIGHT, SUBSEQUENT ENCOUNTER: ICD-10-CM

## 2025-07-07 DIAGNOSIS — S93.491D HIGH ANKLE SPRAIN, RIGHT, SUBSEQUENT ENCOUNTER: ICD-10-CM

## 2025-07-07 DIAGNOSIS — M72.2 PLANTAR FASCIITIS OF RIGHT FOOT: ICD-10-CM

## 2025-07-07 DIAGNOSIS — M76.71 PERONEAL TENDINITIS OF RIGHT LOWER LEG: ICD-10-CM

## 2025-07-07 PROCEDURE — 73610 X-RAY EXAM OF ANKLE: CPT | Mod: RT

## 2025-07-07 PROCEDURE — 99212 OFFICE O/P EST SF 10 MIN: CPT | Performed by: NURSE PRACTITIONER

## 2025-07-07 PROCEDURE — 3008F BODY MASS INDEX DOCD: CPT | Performed by: NURSE PRACTITIONER

## 2025-07-07 PROCEDURE — 1036F TOBACCO NON-USER: CPT | Performed by: NURSE PRACTITIONER

## 2025-07-07 PROCEDURE — 99214 OFFICE O/P EST MOD 30 MIN: CPT | Performed by: NURSE PRACTITIONER

## 2025-07-07 PROCEDURE — 73610 X-RAY EXAM OF ANKLE: CPT | Mod: RIGHT SIDE | Performed by: RADIOLOGY

## 2025-07-07 ASSESSMENT — PATIENT HEALTH QUESTIONNAIRE - PHQ9
2. FEELING DOWN, DEPRESSED OR HOPELESS: NOT AT ALL
SUM OF ALL RESPONSES TO PHQ9 QUESTIONS 1 AND 2: 0
1. LITTLE INTEREST OR PLEASURE IN DOING THINGS: NOT AT ALL

## 2025-07-07 ASSESSMENT — ENCOUNTER SYMPTOMS
LOSS OF SENSATION IN FEET: 0
OCCASIONAL FEELINGS OF UNSTEADINESS: 1
DEPRESSION: 0

## 2025-07-07 ASSESSMENT — PAIN SCALES - GENERAL
PAINLEVEL_OUTOF10: 4
PAINLEVEL_OUTOF10: 4

## 2025-07-07 ASSESSMENT — PAIN - FUNCTIONAL ASSESSMENT: PAIN_FUNCTIONAL_ASSESSMENT: 0-10

## 2025-07-07 ASSESSMENT — PAIN DESCRIPTION - DESCRIPTORS: DESCRIPTORS: ACHING;SORE;STABBING

## 2025-07-07 ASSESSMENT — COLUMBIA-SUICIDE SEVERITY RATING SCALE - C-SSRS
6. HAVE YOU EVER DONE ANYTHING, STARTED TO DO ANYTHING, OR PREPARED TO DO ANYTHING TO END YOUR LIFE?: NO
2. HAVE YOU ACTUALLY HAD ANY THOUGHTS OF KILLING YOURSELF?: NO
1. IN THE PAST MONTH, HAVE YOU WISHED YOU WERE DEAD OR WISHED YOU COULD GO TO SLEEP AND NOT WAKE UP?: NO

## 2025-08-04 ENCOUNTER — APPOINTMENT (OUTPATIENT)
Dept: PRIMARY CARE | Facility: CLINIC | Age: 30
End: 2025-08-04
Payer: COMMERCIAL

## 2025-08-06 ENCOUNTER — OFFICE VISIT (OUTPATIENT)
Dept: PRIMARY CARE | Facility: CLINIC | Age: 30
End: 2025-08-06
Payer: COMMERCIAL

## 2025-08-06 VITALS
SYSTOLIC BLOOD PRESSURE: 130 MMHG | OXYGEN SATURATION: 100 % | DIASTOLIC BLOOD PRESSURE: 70 MMHG | TEMPERATURE: 97.9 F | WEIGHT: 293 LBS | BODY MASS INDEX: 50.53 KG/M2 | HEART RATE: 76 BPM

## 2025-08-06 DIAGNOSIS — S92.901S CLOSED FRACTURE OF RIGHT FOOT, SEQUELA: Primary | ICD-10-CM

## 2025-08-06 DIAGNOSIS — E66.01 MORBID OBESITY (MULTI): ICD-10-CM

## 2025-08-06 PROCEDURE — 1036F TOBACCO NON-USER: CPT | Performed by: INTERNAL MEDICINE

## 2025-08-06 PROCEDURE — 99214 OFFICE O/P EST MOD 30 MIN: CPT | Performed by: INTERNAL MEDICINE

## 2025-08-06 ASSESSMENT — ENCOUNTER SYMPTOMS
DIFFICULTY URINATING: 0
DIZZINESS: 0
DIARRHEA: 0
SORE THROAT: 0
COUGH: 0
WHEEZING: 0
JOINT SWELLING: 1
LEG PAIN: 1
SINUS PAIN: 0
BRUISES/BLEEDS EASILY: 0
PALPITATIONS: 0
BLOOD IN STOOL: 0
UNEXPECTED WEIGHT CHANGE: 0
ABDOMINAL PAIN: 0
ARTHRALGIAS: 0
FEVER: 0
FATIGUE: 0
HEADACHES: 0

## 2025-08-06 NOTE — PROGRESS NOTES
Subjective   Patient ID: Facundo Mar is a 30 y.o. female who presents for Leg Pain (Right leg pain, previous recent fracture, feels like hard ball on bottom of foot).     - Recent emergency room and plan of care reviewed patient diagnosed with right fourth  avulsion fracture of the navicular  near talonavicular joint with small ossific density present similar  to prior.,  No ankle fractures  -Patient continues to have pain unable to stand not using any food or support takes Motrin without improvement  - Patient pain under the bottom of her right foot patient will need to obtain MRI of the right foot for further recommendation continue with Motrin ankle support refer patient to ankle and foot surgery in [place]    - Past medical history, history of treatment of syphilis penicillin IM every week x 3, treated with doxycycline for gonorrhea awaiting follow-up infectious disease at  in 6 months..  - History of drug abuse cocaine methadone now patient drug-free  Using marijuana for anxiety  - Strong family history of breast cancer at young age and her mother age 25-30 her and history of brain cancer in her aunt and ovarian cancer patient may have underlying BRCA mutation.  Referred patient to high risk breast surgery for further eval recommendation   -Morbid obesity counseled about weight loss low-carb diet  - Marijuana use counseled about abstinence  -Recent treatment for syphilis and gonorrhea comes about prevention and protection  Follow-up closely as needed         Leg Pain            Review of Systems   Constitutional:  Negative for fatigue, fever and unexpected weight change.   HENT:  Negative for congestion, ear discharge, ear pain, mouth sores, sinus pain and sore throat.    Eyes:  Negative for visual disturbance.   Respiratory:  Negative for cough and wheezing.    Cardiovascular:  Negative for chest pain, palpitations and leg swelling.   Gastrointestinal:  Negative for abdominal pain, blood in stool  and diarrhea.   Genitourinary:  Negative for difficulty urinating.   Musculoskeletal:  Positive for joint swelling. Negative for arthralgias.   Skin:  Negative for rash.   Neurological:  Negative for dizziness and headaches.   Hematological:  Does not bruise/bleed easily.   Psychiatric/Behavioral:  Negative for behavioral problems.    All other systems reviewed and are negative.      Objective   Lab Results   Component Value Date    HGBA1C 5.3 01/29/2025      /70   Pulse 76   Temp 36.6 °C (97.9 °F)   Wt 146 kg (322 lb 9.6 oz)   LMP 05/01/2025 (Approximate)   SpO2 100%   BMI 50.53 kg/m²     Physical Exam  Vitals and nursing note reviewed.   Constitutional:       Appearance: Normal appearance.   HENT:      Head: Normocephalic.      Nose: Nose normal.     Eyes:      Conjunctiva/sclera: Conjunctivae normal.      Pupils: Pupils are equal, round, and reactive to light.       Cardiovascular:      Rate and Rhythm: Regular rhythm.   Pulmonary:      Effort: Pulmonary effort is normal.      Breath sounds: Normal breath sounds.   Abdominal:      General: Abdomen is flat.      Palpations: Abdomen is soft.     Musculoskeletal:         General: Swelling and tenderness (Right foot tenderness and swelling on the fifth metatarsal bone and under the right ankle) present.      Cervical back: Neck supple.     Skin:     General: Skin is warm.     Neurological:      General: No focal deficit present.      Mental Status: She is oriented to person, place, and time.     Psychiatric:         Mood and Affect: Mood normal.         Assessment/Plan   Elexusnight was seen today for leg pain.  Diagnoses and all orders for this visit:  Closed fracture of right foot, sequela (Primary)  -     Referral to Orthopedics and Sports Medicine; Future  -     MR foot right wo IV contrast; Future  Morbid obesity (Multi)   - Recent emergency room and plan of care reviewed patient diagnosed with right fourth  avulsion fracture of the navicular  near  talonavicular joint with small ossific density present similar  to prior.,  No ankle fractures  -Patient continues to have pain unable to stand not using any food or support takes Motrin without improvement  - Patient pain under the bottom of her right foot patient will need to obtain MRI of the right foot for further recommendation continue with Motrin ankle support refer patient to ankle and foot surgery in [place]    - Past medical history, history of treatment of syphilis penicillin IM every week x 3, treated with doxycycline for gonorrhea awaiting follow-up infectious disease at  in 6 months..  - History of drug abuse cocaine methadone now patient drug-free  Using marijuana for anxiety  - Strong family history of breast cancer at young age and her mother age 25-30 her and history of brain cancer in her aunt and ovarian cancer patient may have underlying BRCA mutation.  Referred patient to high risk breast surgery for further eval recommendation   -Morbid obesity counseled about weight loss low-carb diet  - Marijuana use counseled about abstinence  -Recent treatment for syphilis and gonorrhea comes about prevention and protection  Follow-up closely as needed

## 2025-08-21 DIAGNOSIS — J45.20 MILD INTERMITTENT ASTHMA WITHOUT COMPLICATION (HHS-HCC): ICD-10-CM

## 2025-08-21 RX ORDER — FLUTICASONE PROPIONATE AND SALMETEROL 250; 50 UG/1; UG/1
1 POWDER RESPIRATORY (INHALATION)
COMMUNITY
End: 2025-08-21 | Stop reason: SDUPTHER

## 2025-08-21 RX ORDER — FLUTICASONE PROPIONATE AND SALMETEROL 250; 50 UG/1; UG/1
1 POWDER RESPIRATORY (INHALATION)
Qty: 60 EACH | Refills: 2 | Status: SHIPPED | OUTPATIENT
Start: 2025-08-21